# Patient Record
Sex: MALE | Race: WHITE | NOT HISPANIC OR LATINO | Employment: OTHER | URBAN - METROPOLITAN AREA
[De-identification: names, ages, dates, MRNs, and addresses within clinical notes are randomized per-mention and may not be internally consistent; named-entity substitution may affect disease eponyms.]

---

## 2018-01-09 ENCOUNTER — HOSPITAL ENCOUNTER (EMERGENCY)
Facility: HOSPITAL | Age: 63
Discharge: HOME | End: 2018-01-09
Attending: EMERGENCY MEDICINE | Admitting: EMERGENCY MEDICINE
Payer: COMMERCIAL

## 2018-01-09 VITALS
HEART RATE: 77 BPM | RESPIRATION RATE: 18 BRPM | TEMPERATURE: 97.7 F | SYSTOLIC BLOOD PRESSURE: 126 MMHG | DIASTOLIC BLOOD PRESSURE: 76 MMHG | OXYGEN SATURATION: 95 % | HEIGHT: 67 IN | WEIGHT: 230 LBS | BODY MASS INDEX: 36.1 KG/M2

## 2018-01-09 DIAGNOSIS — H16.9 KERATITIS: Primary | ICD-10-CM

## 2018-01-09 PROCEDURE — 99283 EMERGENCY DEPT VISIT LOW MDM: CPT

## 2018-01-09 RX ORDER — OFLOXACIN 3 MG/ML
1 SOLUTION/ DROPS OPHTHALMIC 4 TIMES DAILY
Qty: 5 ML | Refills: 0 | Status: SHIPPED | OUTPATIENT
Start: 2018-01-09 | End: 2018-01-16

## 2018-01-09 RX ADMIN — FLUORESCEIN SODIUM 1 STRIP: 0.6 STRIP OPHTHALMIC at 21:51

## 2018-01-10 NOTE — DISCHARGE INSTRUCTIONS
Take the antibiotic eyedrops as prescribed  Use a saline or salt water lubricating drops to help with any irritation  Use a warm compress to your eye lid to help with swelling and irritation  Follow up closely with your eye doctor for further evaluation of your eye  Keratitis   WHAT YOU NEED TO KNOW:   Keratitis is inflammation of the cornea  The cornea is the clear layer that covers the front of your eye  Keratitis usually affects one eye, but you can have it in both eyes  DISCHARGE INSTRUCTIONS:   Medicines:   · Infection medicine: You may be given medicine to treat an infection caused by bacteria, a fungus, or a virus  These medicines may be given as eyedrops or as pills  · Steroids: This medicine decreases inflammation  It is given as eyedrops  · Take your medicine as directed  Contact your healthcare provider if you think your medicine is not helping or if you have side effects  Tell him of her if you are allergic to any medicine  Keep a list of the medicines, vitamins, and herbs you take  Include the amounts, and when and why you take them  Bring the list or the pill bottles to follow-up visits  Carry your medicine list with you in case of an emergency  Follow up with or ophthalmologist as directed:  Write down your questions so you remember to ask them during your visits  Help prevent keratitis:   · Use contact lenses correctly  Know how long to use them and how to clean and store them properly  · Wash your hands often  Use soap and water  Wash your hands after you use the bathroom, change a child's diapers, or sneeze  Wash your hands before you prepare or eat food  · Wear safety equipment when you work, garden, or play sports  · Do not rub your eyes while you work with wood or metal   Contact your ophthalmologist if:   · You have a fever  · Your symptoms get worse, even after treatment  · You have pus draining from your eye       · You have questions or concerns about your condition or care  Return to the emergency department if:   · You have severe eye pain  · You have a sudden change in your vision  · You suddenly lose your vision  © 2017 [employer] Information is for End User's use only and may not be sold, redistributed or otherwise used for commercial purposes  All illustrations and images included in CareNotes® are the copyrighted property of [employer]  or Junior Wilkinson  The above information is an  only  It is not intended as medical advice for individual conditions or treatments  Talk to your doctor, nurse or pharmacist before following any medical regimen to see if it is safe and effective for you

## 2018-01-10 NOTE — ED PROVIDER NOTES
History  Chief Complaint   Patient presents with    Eye Problem     Patient reports eye swelling, patient reports twitching for the past 3 weeks and sweling started today     HPI    None       History reviewed  No pertinent past medical history  History reviewed  No pertinent surgical history  History reviewed  No pertinent family history  I have reviewed and agree with the history as documented  Social History   Substance Use Topics    Smoking status: Never Smoker    Smokeless tobacco: Never Used    Alcohol use Yes        Review of Systems    Physical Exam  ED Triage Vitals [01/09/18 2034]   Temperature Pulse Respirations Blood Pressure SpO2   97 7 °F (36 5 °C) 77 18 126/76 95 %      Temp Source Heart Rate Source Patient Position - Orthostatic VS BP Location FiO2 (%)   Oral Monitor Sitting Left arm --      Pain Score       2           Orthostatic Vital Signs  Vitals:    01/09/18 2034   BP: 126/76   Pulse: 77   Patient Position - Orthostatic VS: Sitting       Physical Exam   Constitutional: He is oriented to person, place, and time  He appears well-developed and well-nourished  No distress  HENT:   Head: Normocephalic and atraumatic  Eyes: Conjunctivae and EOM are normal  Pupils are equal, round, and reactive to light  Lids are everted and swept, no foreign bodies found  Left eye exhibits discharge (scant, clear mucous)  Left eye exhibits no chemosis, no exudate and no hordeolum  No foreign body present in the left eye  Slit lamp exam:       The left eye shows fluorescein uptake (mild, scattered, diffuse)  The left eye shows no corneal abrasion, no corneal flare, no corneal ulcer, no foreign body, no hyphema and no hypopyon  Neck: Normal range of motion  No tracheal deviation present  Cardiovascular: Normal rate, regular rhythm and intact distal pulses  Pulmonary/Chest: Effort normal  No respiratory distress  Neurological: He is alert and oriented to person, place, and time   GCS eye subscore is 4  GCS verbal subscore is 5  GCS motor subscore is 6  Skin: Skin is warm and dry  Psychiatric: He has a normal mood and affect  His behavior is normal    Nursing note and vitals reviewed  ED Medications  Medications   fluorescein sodium sterile ophthalmic strip 1 strip (1 strip Left Eye Given by Other 18)       Diagnostic Studies  Results Reviewed     None                 No orders to display              Procedures  Procedures       Phone Contacts  ED Phone Contact    ED Course  ED Course                                MDM  Number of Diagnoses or Management Options  Keratitis: new and does not require workup  Diagnosis management comments: This is a 26-year-old male who presents here today with left eye foreign body sensation  Notes that at night sometimes his left upper eyelid seems to twitch  He does endorse recent crying recently, as his wife  a little over a month ago  He has been rubbing at his eyes more frequently because of this, but denies any discharge or known injuries to his eyes  He denies any vision changes  He denies any photophobia or actual pain in the eye  He was rubbing his eye this evening and the lid felt slightly uncomfortable, and noticed it was slightly swollen in the mirror, prompting him to come to the ER for evaluation  He has not taken or done anything for his symptoms  He was supposed to follow up with his eye doctor this month for discussion of cataract surgery on his left eye, after he had surgery on his right eye about a year ago, but this appointment was canceled  He has not seen his eye doctor or his primary care doctor about his recent twitching, nor the swelling today  He denies any redness of the eye itself  He denies any known foreign body exposures  ROS: Otherwise negative, unless stated as above  He is well-appearing, in no acute distress   He has minimal swelling of the upper lid without erythema, more consistent with irritation then infectious  There is no tenderness to this area  The globe appears normal, however he does have mild diffuse fluorescein uptake across the entire cornea, more consistent with keratitis  There are no signs of foreign body, ulcers, abrasions  I discussed with the patient findings, treatment at home, follow-up, and indications for return, and he expresses understanding with this plan  CritCare Time    Disposition  Final diagnoses:   Keratitis - left     Time reflects when diagnosis was documented in both MDM as applicable and the Disposition within this note     Time User Action Codes Description Comment    1/9/2018 10:04 PM Radha Cosme Add [H16 9] Keratitis     1/9/2018 10:04 PM Radha Cosme Modify [H16 9] Keratitis left      ED Disposition     ED Disposition Condition Comment    Discharge  Lelia Vincent discharge to home/self care  Condition at discharge: Good        Follow-up Information     Follow up With Specialties Details Why Contact Info    your eye doctor  Schedule an appointment as soon as possible for a visit in 3 days to follow up on your eye         Patient's Medications   Discharge Prescriptions    OFLOXACIN (OCUFLOX) 0 3 % OPHTHALMIC SOLUTION    Administer 1 drop into the left eye 4 (four) times a day for 7 days       Start Date: 1/9/2018  End Date: 1/16/2018       Order Dose: 1 drop       Quantity: 5 mL    Refills: 0     No discharge procedures on file      ED Provider  Electronically Signed by           Hieu Roque MD  01/09/18 0837

## 2018-06-07 ENCOUNTER — HOSPITAL ENCOUNTER (EMERGENCY)
Facility: HOSPITAL | Age: 63
Discharge: HOME | End: 2018-06-07
Attending: EMERGENCY MEDICINE
Payer: COMMERCIAL

## 2018-06-07 VITALS
RESPIRATION RATE: 16 BRPM | BODY MASS INDEX: 36.02 KG/M2 | OXYGEN SATURATION: 94 % | WEIGHT: 230 LBS | DIASTOLIC BLOOD PRESSURE: 64 MMHG | TEMPERATURE: 97.6 F | SYSTOLIC BLOOD PRESSURE: 134 MMHG | HEART RATE: 66 BPM

## 2018-06-07 DIAGNOSIS — B35.9 TINEA: Primary | ICD-10-CM

## 2018-06-07 PROCEDURE — 99282 EMERGENCY DEPT VISIT SF MDM: CPT

## 2018-06-07 RX ORDER — MUPIROCIN CALCIUM 20 MG/G
CREAM TOPICAL 3 TIMES DAILY
Qty: 15 G | Refills: 0 | Status: SHIPPED | OUTPATIENT
Start: 2018-06-07 | End: 2019-12-07 | Stop reason: ALTCHOICE

## 2018-06-07 RX ORDER — CLOTRIMAZOLE 1 %
CREAM (GRAM) TOPICAL 2 TIMES DAILY
Qty: 30 G | Refills: 0 | Status: SHIPPED | OUTPATIENT
Start: 2018-06-07 | End: 2019-12-07 | Stop reason: ALTCHOICE

## 2018-06-07 NOTE — DISCHARGE INSTRUCTIONS
Skin Yeast Infection   WHAT YOU NEED TO KNOW:   Yeast is normally present on the skin  Infection happens when you have too much yeast, or when it gets into a cut on your skin  Certain types of mold and fungus can cause a yeast infection  A skin yeast infection can appear anywhere on your skin or nail beds  Skin yeast infections are usually found on warm, moist parts of the body  Examples include between skin folds or under the breasts  DISCHARGE INSTRUCTIONS:   Return to the emergency department if:   · You have signs of infection, such as pus, warmth or red streaks coming from the wound, or a fever  Contact your healthcare provider if:   · Your symptoms worsen or do not get better within 7 to 10 days  · You have new or returning signs of a skin yeast infection after treatment  · You have questions or concerns about your condition or care  Medicines:   · Antifungal medicine  may be given as a cream, ointment, or pill  · Take your medicine as directed  Contact your healthcare provider if you think your medicine is not helping or if you have side effects  Tell him or her if you are allergic to any medicine  Keep a list of the medicines, vitamins, and herbs you take  Include the amounts, and when and why you take them  Bring the list or the pill bottles to follow-up visits  Carry your medicine list with you in case of an emergency  Care for the skin near the infection:  You may only have discolored patches of skin, or areas that are dry and flaking  Care for these skin problems as directed by your healthcare provider  If you have painful skin or an open sore, you will need to protect the skin and prevent damage  You will also need to keep the skin dry as much as possible  Ask your healthcare provider how to care for your skin while the infection clears  The following are general guidelines for caring for painful or open skin:  · Keep the skin clean    Ask your healthcare provider if you should wash with mild soap and water  Do not use soap that contains alcohol  Alcohol can dry and irritate the skin and make symptoms worse  Your baby's healthcare provider may tell you to use diaper cream or ointment when you change his diaper  This will protect the skin and prevent moisture from collecting  · Keep the skin dry  Pat the area dry with a towel  Do not rub, because this may irritate the skin  If you have a skin yeast infection between skin folds, lift the top part gently and hold it while you dry between your skin folds  Always dry your feet completely after you swim or bathe, including between your toes  Dry your skin if you are sweating from exercise or exposure to heat  Use a clean towel each time to prevent spreading or continuing the infection  · Keep the skin protected  Ask your healthcare provider if you should cover the area with a bandage or leave it open  Check your skin each day to make sure you do not have new or worsening problems  You may need to have someone check the skin if you cannot see the area easily  Prevent another skin yeast infection:   · Do not share clothing or towels    · Wear shower shoes if you need to use a public shower    · Dry your feet completely after you bathe, and apply antifungal powder or cream as directed    · Put on socks before you get dressed so you do not spread fungus from your feet    · Wear light clothing that allows air to get to your skin    · Manage your weight to prevent skin folds where yeast can collect    · Manage diabetes    · Change your baby's diaper often, and keep the area clean and dry as much as possible    · Use a diaper cream or ointment that contains zinc oxide or dimethicone on your baby's diaper area as directed  Follow up with your healthcare provider as directed:  Write down your questions so you remember to ask them during your visits     © 2017 [employer] Information is for End User's use only and may not be sold, redistributed or otherwise used for commercial purposes  All illustrations and images included in CareNotes® are the copyrighted property of [employer]  or [employer] Wilkinson  The above information is an  only  It is not intended as medical advice for individual conditions or treatments  Talk to your doctor, nurse or pharmacist before following any medical regimen to see if it is safe and effective for you

## 2018-06-07 NOTE — ED PROVIDER NOTES
History  Chief Complaint   Patient presents with    Rash     pt has had a rash under his right armpit for the past two weeks that started when he was vactioning down [address] [place], also states he has been outside alot , reports feeling more tired denies seeing any bites     23-year-old male patient presents emergency department for evaluation of rash in his right armpit  The patient states that it has been there for several weeks, is very itchy, and is most clinically a appearing like tinea  Patient may have some small surrounding cellulitis will be treated with topical antibiotics  History provided by:  Patient   used: No    Rash   Location:  Shoulder/arm  Quality: itchiness and redness    Quality: not blistering and not bruising    Severity:  Mild  Timing:  Constant  Progression:  Worsening  Relieved by:  Nothing  Worsened by:  Nothing  Ineffective treatments:  None tried  Associated symptoms: no fever, no joint pain and no tongue swelling        None       History reviewed  No pertinent past medical history  History reviewed  No pertinent surgical history  History reviewed  No pertinent family history  I have reviewed and agree with the history as documented  Social History   Substance Use Topics    Smoking status: Never Smoker    Smokeless tobacco: Never Used    Alcohol use Yes        Review of Systems   Constitutional: Negative for fever  Musculoskeletal: Negative for arthralgias  Skin: Positive for rash  All other systems reviewed and are negative  Physical Exam  Physical Exam   Constitutional: He is oriented to person, place, and time  He appears well-developed and well-nourished  No distress  HENT:   Head: Normocephalic and atraumatic  Right Ear: External ear normal    Left Ear: External ear normal    Eyes: Conjunctivae and EOM are normal  Right eye exhibits no discharge  Left eye exhibits no discharge  No scleral icterus  Neck: Normal range of motion  Neck supple  No JVD present  No tracheal deviation present  No thyromegaly present  Cardiovascular: Normal rate and regular rhythm  Pulmonary/Chest: Effort normal and breath sounds normal  No stridor  No respiratory distress  He has no wheezes  He has no rales  Abdominal: Soft  Bowel sounds are normal  He exhibits no distension  There is no tenderness  Musculoskeletal: Normal range of motion  He exhibits no edema, tenderness or deformity  Neurological: He is alert and oriented to person, place, and time  No cranial nerve deficit  Coordination normal    Skin: Skin is warm and dry  He is not diaphoretic  Psychiatric: He has a normal mood and affect  His behavior is normal    Nursing note and vitals reviewed        Vital Signs  ED Triage Vitals [06/07/18 0154]   Temperature Pulse Respirations Blood Pressure SpO2   97 6 °F (36 4 °C) 63 16 134/64 93 %      Temp Source Heart Rate Source Patient Position - Orthostatic VS BP Location FiO2 (%)   Oral Monitor Sitting Right arm --      Pain Score       --           Vitals:    06/07/18 0154 06/07/18 0200   BP: 134/64 134/64   Pulse: 63 66   Patient Position - Orthostatic VS: Sitting        Visual Acuity      ED Medications  Medications - No data to display    Diagnostic Studies  Results Reviewed     None                 No orders to display              Procedures  Procedures       Phone Contacts  ED Phone Contact    ED Course                               MDM  Number of Diagnoses or Management Options  Tinea: new and requires workup     Amount and/or Complexity of Data Reviewed  Decide to obtain previous medical records or to obtain history from someone other than the patient: yes  Review and summarize past medical records: yes    Patient Progress  Patient progress: stable    CritCare Time    Disposition  Final diagnoses:   Tinea     Time reflects when diagnosis was documented in both MDM as applicable and the Disposition within this note     Time User Action Codes Description Comment    6/7/2018  2:22 AM Susie Laughter Add [B35 9] Tinea       ED Disposition     ED Disposition Condition Comment    Discharge  Khoa Parish discharge to home/self care  Condition at discharge: Good        Follow-up Information     Follow up With Specialties Details Why [address] , [address] [place] [address] Juan   [phone]            Discharge Medication List as of 6/7/2018  2:23 AM      START taking these medications    Details   clotrimazole (LOTRIMIN) 1 % cream Apply topically 2 (two) times a day, Starting u 6/7/2018, Print      mupirocin (BACTROBAN) 2 % cream Apply topically 3 (three) times a day, Starting Thu 6/7/2018, Print      pyrithione zinc (HEAD AND SHOULDERS) 1 % shampoo Apply topically daily as needed for dandruff, Starting Thu 6/7/2018, Print           No discharge procedures on file      ED Provider  Electronically Signed by           Eva Chacko DO  06/07/18 6718

## 2018-11-28 ENCOUNTER — OFFICE VISIT (OUTPATIENT)
Dept: URGENT CARE | Facility: CLINIC | Age: 63
End: 2018-11-28
Payer: COMMERCIAL

## 2018-11-28 VITALS
WEIGHT: 240 LBS | HEIGHT: 67 IN | DIASTOLIC BLOOD PRESSURE: 72 MMHG | HEART RATE: 103 BPM | BODY MASS INDEX: 37.67 KG/M2 | OXYGEN SATURATION: 95 % | TEMPERATURE: 98.1 F | SYSTOLIC BLOOD PRESSURE: 130 MMHG

## 2018-11-28 DIAGNOSIS — J06.9 UPPER RESPIRATORY TRACT INFECTION, UNSPECIFIED TYPE: Primary | ICD-10-CM

## 2018-11-28 PROCEDURE — S9088 SERVICES PROVIDED IN URGENT: HCPCS | Performed by: PHYSICIAN ASSISTANT

## 2018-11-28 PROCEDURE — 99203 OFFICE O/P NEW LOW 30 MIN: CPT | Performed by: PHYSICIAN ASSISTANT

## 2018-11-28 RX ORDER — DEXTROMETHORPHAN HYDROBROMIDE AND PROMETHAZINE HYDROCHLORIDE 15; 6.25 MG/5ML; MG/5ML
5 SYRUP ORAL 4 TIMES DAILY PRN
Qty: 118 ML | Refills: 0 | Status: SHIPPED | OUTPATIENT
Start: 2018-11-28 | End: 2019-12-07 | Stop reason: ALTCHOICE

## 2018-11-28 RX ORDER — ALBUTEROL SULFATE 90 UG/1
2 AEROSOL, METERED RESPIRATORY (INHALATION) EVERY 6 HOURS PRN
Qty: 1 INHALER | Refills: 0 | Status: SHIPPED | OUTPATIENT
Start: 2018-11-28 | End: 2018-12-05

## 2018-11-28 RX ORDER — PREDNISONE 20 MG/1
20 TABLET ORAL 2 TIMES DAILY WITH MEALS
Qty: 10 TABLET | Refills: 0 | Status: SHIPPED | OUTPATIENT
Start: 2018-11-28 | End: 2018-12-03

## 2018-11-28 NOTE — PROGRESS NOTES
[facility]        NAME: Barbara Judge is a 58 y o  male  : 1955    MRN: [id number]  DATE: 2018  TIME: 10:45 AM    Assessment and Plan   Upper respiratory tract infection, unspecified type [J06 9]  1  Upper respiratory tract infection, unspecified type  albuterol (PROVENTIL HFA,VENTOLIN HFA) 90 mcg/act inhaler    predniSONE 20 mg tablet    promethazine-dextromethorphan (PHENERGAN-DM) 6 25-15 mg/5 mL oral syrup         Patient Instructions     1  Upper respiratory infection  -Patient declined throat swab at this time  -Take prednisone as directed  -Use cough medicine as directed  -Use inhaler or nebulizer every 4-6 hours as needed  -Tylenol/Motrin  -Increase fluids  -Follow-up with PCP within 3-5 days if no improvement    Go to ER with worsening symptoms, fever, chest pain, shortness of breath, or any signs of distress    Chief Complaint     Chief Complaint   Patient presents with    Sore Throat     Started Monday  Accompanied by productive cough with yellow mucus  No sinus pressure  Mild fever on Monday  Not taking medication for symptoms  History of COPD         History of Present Illness       The patient is a 19-year-old male with a medical history of COPD who presents today for evaluation of cold symptoms that started on Monday  Patient admits that he had a sore throat on Monday which seems to have now improved  Patient continues with a cough that at times brings up yellow mucus  Patient states he has not tried any over-the-counter medications  Patient states he currently does not use an inhaler for his COPD because it is too expensive  Review of Systems   Review of Systems   Constitutional: Negative for chills and fever  HENT: Positive for sore throat  Negative for ear pain and rhinorrhea  Respiratory: Positive for cough and shortness of breath  Cardiovascular: Negative for chest pain  Musculoskeletal: Negative for arthralgias     Neurological: Negative for headaches  Current Medications       Current Outpatient Prescriptions:     albuterol (PROVENTIL HFA,VENTOLIN HFA) 90 mcg/act inhaler, Inhale 2 puffs every 6 (six) hours as needed for shortness of breath for up to 7 days, Disp: 1 Inhaler, Rfl: 0    clotrimazole (LOTRIMIN) 1 % cream, Apply topically 2 (two) times a day (Patient not taking: Reported on 11/28/2018 ), Disp: 30 g, Rfl: 0    mupirocin (BACTROBAN) 2 % cream, Apply topically 3 (three) times a day (Patient not taking: Reported on 11/28/2018 ), Disp: 15 g, Rfl: 0    predniSONE 20 mg tablet, Take 1 tablet (20 mg total) by mouth 2 (two) times a day with meals for 5 days, Disp: 10 tablet, Rfl: 0    promethazine-dextromethorphan (PHENERGAN-DM) 6 25-15 mg/5 mL oral syrup, Take 5 mL by mouth 4 (four) times a day as needed for cough, Disp: 118 mL, Rfl: 0    pyrithione zinc (HEAD AND SHOULDERS) 1 % shampoo, Apply topically daily as needed for dandruff (Patient not taking: Reported on 11/28/2018 ), Disp: 400 mL, Rfl: 0    Current Allergies     Allergies as of 11/28/2018    (No Known Allergies)            The following portions of the patient's history were reviewed and updated as appropriate: allergies, current medications, past family history, past medical history, past social history, past surgical history and problem list      History reviewed  No pertinent past medical history  History reviewed  No pertinent surgical history  History reviewed  No pertinent family history  Medications have been verified  Objective   /72 (BP Location: Left arm, Patient Position: Sitting, Cuff Size: Large)   Pulse 103   Temp 98 1 °F (36 7 °C) (Temporal)   Ht 5' 7" (1 702 m)   Wt 109 kg (240 lb)   SpO2 95%   BMI 37 59 kg/m²        Physical Exam     Physical Exam   Constitutional: He is oriented to person, place, and time  He appears well-developed and well-nourished  No distress     HENT:   Right Ear: Tympanic membrane and external ear normal    Left Ear: Tympanic membrane and external ear normal    Nose: Nose normal    Mouth/Throat: Uvula is midline, oropharynx is clear and moist and mucous membranes are normal  No oropharyngeal exudate, posterior oropharyngeal edema, posterior oropharyngeal erythema or tonsillar abscesses  Eyes: Pupils are equal, round, and reactive to light  Conjunctivae and EOM are normal    Neck: Normal range of motion  Neck supple  Cardiovascular: Normal rate, regular rhythm and normal heart sounds  Pulmonary/Chest: Effort normal  He has rhonchi in the right upper field and the left upper field  Lymphadenopathy:     He has no cervical adenopathy  Neurological: He is alert and oriented to person, place, and time  Skin: Skin is warm and dry  Psychiatric: He has a normal mood and affect  Nursing note and vitals reviewed

## 2018-11-28 NOTE — PATIENT INSTRUCTIONS
1  Upper respiratory infection  -Take prednisone as directed  -Use cough medicine as directed  -Use inhaler or nebulizer every 4-6 hours as needed  -Tylenol/Motrin  -Increase fluids  -Follow-up with PCP within 3-5 days if no improvement    Go to ER with worsening symptoms, fever, chest pain, shortness of breath, or any signs of distress    Upper Respiratory Infection   AMBULATORY CARE:   An upper respiratory infection  is also called a common cold  It can affect your nose, throat, ears, and sinuses  Common signs and symptoms include the following:  Cold symptoms are usually worst for the first 3 to 5 days  You may have any of the following:  · Runny or stuffy nose    · Sneezing and coughing    · Sore throat or hoarseness    · Red, watery, and sore eyes    · Fatigue     · Chills and fever    · Headache, body aches, or sore muscles  Seek care immediately if:   · You have chest pain or trouble breathing  Contact your healthcare provider if:   · You have a fever over 102ºF (39°C)  · Your sore throat gets worse or you see white or yellow spots in your throat  · Your symptoms get worse after 3 to 5 days or your cold is not better in 14 days  · You have a rash anywhere on your skin  · You have large, tender lumps in your neck  · You have thick, green or yellow drainage from your nose  · You cough up thick yellow, green, or bloody mucus  · You have vomiting for more than 24 hours and cannot keep fluids down  · You have a bad earache  · You have questions or concerns about your condition or care  Treatment for a cold: There is no cure for the common cold  Colds are caused by viruses and do not get better with antibiotics  Most people get better in 7 to 14 days  You may continue to cough for 2 to 3 weeks  The following may help decrease your symptoms:  · Decongestants  help reduce nasal congestion and help you breathe more easily   If you take decongestant pills, they may make you feel restless or not able to sleep  Do not use decongestant sprays for more than a few days  · Cough suppressants  help reduce coughing  Ask your healthcare provider which type of cough medicine is best for you  · NSAIDs , such as ibuprofen, help decrease swelling, pain, and fever  NSAIDs can cause stomach bleeding or kidney problems in certain people  If you take blood thinner medicine, always ask your healthcare provider if NSAIDs are safe for you  Always read the medicine label and follow directions  · Acetaminophen  decreases pain and fever  It is available without a doctor's order  Ask how much to take and how often to take it  Follow directions  Read the labels of all other medicines you are using to see if they also contain acetaminophen, or ask your doctor or pharmacist  Acetaminophen can cause liver damage if not taken correctly  Do not use more than 4 grams (4,000 milligrams) total of acetaminophen in one day  Manage your cold:   · Rest as much as possible  Slowly start to do more each day  · Drink more liquids as directed  Liquids will help thin and loosen mucus so you can cough it up  Liquids will also help prevent dehydration  Liquids that help prevent dehydration include water, fruit juice, and broth  Do not drink liquids that contain caffeine  Caffeine can increase your risk for dehydration  Ask your healthcare provider how much liquid to drink each day  · Soothe a sore throat  Gargle with warm salt water  This helps your sore throat feel better  Make salt water by dissolving ¼ teaspoon salt in 1 cup warm water  You may also suck on hard candy or throat lozenges  You may use a sore throat spray  · Use a humidifier or vaporizer  Use a cool mist humidifier or a vaporizer to increase air moisture in your home  This may make it easier for you to breathe and help decrease your cough  · Use saline nasal drops as directed  These help relieve congestion       · Apply petroleum-based jelly around the outside of your nostrils  This can decrease irritation from blowing your nose  · Do not smoke  Nicotine and other chemicals in cigarettes and cigars can make your symptoms worse  They can also cause infections such as bronchitis or pneumonia  Ask your healthcare provider for information if you currently smoke and need help to quit  E-cigarettes or smokeless tobacco still contain nicotine  Talk to your healthcare provider before you use these products  Prevent spreading your cold to others:   · Try to stay away from other people during the first 2 to 3 days of your cold when it is more easily spread  · Do not share food or drinks  · Do not share hand towels with household members  · Wash your hands often, especially after you blow your nose  Turn away from other people and cover your mouth and nose with a tissue when you sneeze or cough  Follow up with your healthcare provider as directed:  Write down your questions so you remember to ask them during your visits  © 2017 [employer]  Information is for End User's use only and may not be sold, redistributed or otherwise used for commercial purposes  All illustrations and images included in CareNotes® are the copyrighted property of [employer]  or [employer] Wilkinson  The above information is an  only  It is not intended as medical advice for individual conditions or treatments  Talk to your doctor, nurse or pharmacist before following any medical regimen to see if it is safe and effective for you

## 2019-08-06 ENCOUNTER — TELEPHONE (OUTPATIENT)
Dept: FAMILY MEDICINE CLINIC | Facility: CLINIC | Age: 64
End: 2019-08-06

## 2019-08-09 NOTE — TELEPHONE ENCOUNTER
Spoke to patient and let him know he came to the wrong office to sign release  He said he was signing a release from Dr Andrea Iqbal  He is going to their office to sign

## 2019-08-26 ENCOUNTER — OFFICE VISIT (OUTPATIENT)
Dept: UROLOGY | Facility: MEDICAL CENTER | Age: 64
End: 2019-08-26
Payer: COMMERCIAL

## 2019-08-26 VITALS
HEIGHT: 68 IN | SYSTOLIC BLOOD PRESSURE: 128 MMHG | DIASTOLIC BLOOD PRESSURE: 72 MMHG | HEART RATE: 89 BPM | WEIGHT: 220 LBS | BODY MASS INDEX: 33.34 KG/M2

## 2019-08-26 DIAGNOSIS — N13.8 BPH WITH URINARY OBSTRUCTION: Primary | ICD-10-CM

## 2019-08-26 DIAGNOSIS — N40.1 BPH WITH URINARY OBSTRUCTION: Primary | ICD-10-CM

## 2019-08-26 PROCEDURE — 99244 OFF/OP CNSLTJ NEW/EST MOD 40: CPT | Performed by: UROLOGY

## 2019-08-26 NOTE — PROGRESS NOTES
Assessment/Plan:  1  PSA quite low at 0 2  Prostate exam is irregular, I would not really called a nodule  2  No real symptoms at all  3  The very low PSA is reassuring, his exam is within normal limits  At this point no further need for evaluation with us unless something new comes up in terms of PSA, symptoms, or prostate exam   No problem-specific Assessment & Plan notes found for this encounter  Diagnoses and all orders for this visit:    BPH with urinary obstruction          Subjective:      Patient ID: Raúl Hanna is a 58 y o  male  Referred for prostate examination, patient has minimal symptoms  Nocturia x1  No hematuria infections stones  Denies prior treatment for any prostate or bladder conditions      The following portions of the patient's history were reviewed and updated as appropriate: allergies, current medications, past family history, past medical history, past social history, past surgical history and problem list     Review of Systems   All other systems reviewed and are negative  Objective:      /72 (BP Location: Left arm, Patient Position: Sitting, Cuff Size: Adult)   Pulse 89   Ht 5' 8" (1 727 m)   Wt 99 8 kg (220 lb)   BMI 33 45 kg/m²          Physical Exam   Constitutional: He is oriented to person, place, and time  He appears well-developed and well-nourished  No distress  HENT:   Head: Normocephalic and atraumatic  Eyes: Conjunctivae are normal    Cardiovascular: Normal rate and regular rhythm  Pulmonary/Chest: Effort normal and breath sounds normal  No respiratory distress  He has no wheezes  Abdominal: Soft  Bowel sounds are normal  He exhibits no distension and no mass  There is no tenderness  Genitourinary:   Genitourinary Comments: Penis testes normal    Prostate slightly irregular but not truly nodular   Neurological: He is alert and oriented to person, place, and time  Skin: Skin is warm and dry  He is not diaphoretic

## 2019-08-29 ENCOUNTER — OFFICE VISIT (OUTPATIENT)
Dept: GASTROENTEROLOGY | Facility: CLINIC | Age: 64
End: 2019-08-29
Payer: COMMERCIAL

## 2019-08-29 VITALS
BODY MASS INDEX: 37.47 KG/M2 | HEIGHT: 68 IN | WEIGHT: 247.2 LBS | HEART RATE: 78 BPM | DIASTOLIC BLOOD PRESSURE: 70 MMHG | SYSTOLIC BLOOD PRESSURE: 110 MMHG

## 2019-08-29 DIAGNOSIS — Z12.11 SCREENING FOR COLON CANCER: Primary | ICD-10-CM

## 2019-08-29 PROCEDURE — 99243 OFF/OP CNSLTJ NEW/EST LOW 30: CPT | Performed by: INTERNAL MEDICINE

## 2019-08-29 NOTE — PROGRESS NOTES
[facility] - Outpatient Consultation  Ranjit Ricci 58 y o  male MRN: [id number]  Encounter: [id number]          ASSESSMENT AND PLAN:      1  Screening for colon cancer    Schedule colonoscopy with a bowel prep  ______________________________________________________________________    HPI:  This 58year old male presents for the purpose of having his first screening colonoscopy  He denies abdominal pain  He has occasional diarrhea and constipation  He denies any rectal bleeding, melena, hematemesis, bloating, nausea, vomiting, heartburn, gas, change in bowel habits  He denies any family history of colon cancer  He denies weight loss  REVIEW OF SYSTEMS:    CONSTITUTIONAL: Denies any fever, chills, rigors, and weight loss  HEENT: No earache or tinnitus  Denies hearing loss or visual disturbances  CARDIOVASCULAR: No chest pain or palpitations  RESPIRATORY: Denies any cough, hemoptysis, shortness of breath or dyspnea on exertion  GASTROINTESTINAL: As noted in the History of Present Illness  GENITOURINARY: No problems with urination  Denies any hematuria or dysuria  NEUROLOGIC: No dizziness or vertigo, denies headaches  MUSCULOSKELETAL: Denies any muscle or joint pain  SKIN: Denies skin rashes or itching  ENDOCRINE: Denies excessive thirst  Denies intolerance to heat or cold  PSYCHOSOCIAL: Denies depression or anxiety  Denies any recent memory loss         Historical Information   Past Medical History:   Diagnosis Date    No pertinent past medical history      Past Surgical History:   Procedure Laterality Date    BREAST LUMPECTOMY      0     Social History   Social History     Substance and Sexual Activity   Alcohol Use Yes    Comment: occ     Social History     Substance and Sexual Activity   Drug Use Yes    Types: Marijuana    Comment: occ     Social History     Tobacco Use   Smoking Status Never Smoker   Smokeless Tobacco Never Used     Family History Problem Relation Age of Onset    Hypertension Father     Stroke Father        Meds/Allergies       Current Outpatient Medications:     clotrimazole (LOTRIMIN) 1 % cream    mupirocin (BACTROBAN) 2 % cream    promethazine-dextromethorphan (PHENERGAN-DM) 6 25-15 mg/5 mL oral syrup    pyrithione zinc (HEAD AND SHOULDERS) 1 % shampoo    No Known Allergies        Objective     Blood pressure 110/70, pulse 78, height 5' 8" (1 727 m), weight 112 kg (247 lb 3 2 oz)  Body mass index is 37 59 kg/m²  PHYSICAL EXAM:      General Appearance:   Alert, cooperative, no distress   HEENT:   Normocephalic, atraumatic, anicteric      Neck:  Supple, symmetrical, trachea midline   Lungs:   Clear to auscultation bilaterally; no rales, rhonchi or wheezing; respirations unlabored    Heart[de-identified]   Regular rate and rhythm; no murmur, rub, or gallop  Abdomen:   Soft, non-tender, non-distended; normal bowel sounds; no masses, no organomegaly    Genitalia:   Deferred    Rectal:   Deferred    Extremities:  No cyanosis, clubbing or edema    Pulses:  2+ and symmetric    Skin:  No jaundice, rashes, or lesions    Lymph nodes:  No palpable cervical lymphadenopathy        Lab Results:   No visits with results within 1 Day(s) from this visit  Latest known visit with results is:   No results found for any previous visit  Radiology Results:   No results found

## 2019-09-03 ENCOUNTER — HOSPITAL ENCOUNTER (OUTPATIENT)
Dept: GASTROENTEROLOGY | Facility: HOSPITAL | Age: 64
Setting detail: OUTPATIENT SURGERY
Discharge: HOME | End: 2019-09-03
Attending: INTERNAL MEDICINE
Payer: COMMERCIAL

## 2019-09-03 ENCOUNTER — ANESTHESIA (OUTPATIENT)
Dept: GASTROENTEROLOGY | Facility: HOSPITAL | Age: 64
End: 2019-09-03

## 2019-09-03 ENCOUNTER — ANESTHESIA EVENT (OUTPATIENT)
Dept: GASTROENTEROLOGY | Facility: HOSPITAL | Age: 64
End: 2019-09-03

## 2019-09-03 VITALS
HEART RATE: 64 BPM | OXYGEN SATURATION: 94 % | RESPIRATION RATE: 18 BRPM | BODY MASS INDEX: 36.42 KG/M2 | SYSTOLIC BLOOD PRESSURE: 109 MMHG | WEIGHT: 240.3 LBS | HEIGHT: 68 IN | DIASTOLIC BLOOD PRESSURE: 63 MMHG | TEMPERATURE: 98 F

## 2019-09-03 DIAGNOSIS — Z12.11 SCREENING FOR COLON CANCER: ICD-10-CM

## 2019-09-03 PROCEDURE — 88305 TISSUE EXAM BY PATHOLOGIST: CPT | Performed by: PATHOLOGY

## 2019-09-03 PROCEDURE — 45385 COLONOSCOPY W/LESION REMOVAL: CPT | Performed by: INTERNAL MEDICINE

## 2019-09-03 RX ORDER — SODIUM CHLORIDE, SODIUM LACTATE, POTASSIUM CHLORIDE, CALCIUM CHLORIDE 600; 310; 30; 20 MG/100ML; MG/100ML; MG/100ML; MG/100ML
125 INJECTION, SOLUTION INTRAVENOUS CONTINUOUS
Status: CANCELLED | OUTPATIENT
Start: 2019-09-03

## 2019-09-03 RX ORDER — SODIUM CHLORIDE, SODIUM LACTATE, POTASSIUM CHLORIDE, CALCIUM CHLORIDE 600; 310; 30; 20 MG/100ML; MG/100ML; MG/100ML; MG/100ML
INJECTION, SOLUTION INTRAVENOUS CONTINUOUS PRN
Status: DISCONTINUED | OUTPATIENT
Start: 2019-09-03 | End: 2019-09-03 | Stop reason: SURG

## 2019-09-03 RX ORDER — LIDOCAINE HYDROCHLORIDE 10 MG/ML
INJECTION, SOLUTION INFILTRATION; PERINEURAL AS NEEDED
Status: DISCONTINUED | OUTPATIENT
Start: 2019-09-03 | End: 2019-09-03 | Stop reason: SURG

## 2019-09-03 RX ORDER — PROPOFOL 10 MG/ML
INJECTION, EMULSION INTRAVENOUS AS NEEDED
Status: DISCONTINUED | OUTPATIENT
Start: 2019-09-03 | End: 2019-09-03 | Stop reason: SURG

## 2019-09-03 RX ADMIN — PROPOFOL 50 MG: 10 INJECTION, EMULSION INTRAVENOUS at 09:23

## 2019-09-03 RX ADMIN — LIDOCAINE HYDROCHLORIDE 50 MG: 10 INJECTION, SOLUTION INFILTRATION; PERINEURAL at 09:10

## 2019-09-03 RX ADMIN — PROPOFOL 150 MG: 10 INJECTION, EMULSION INTRAVENOUS at 09:10

## 2019-09-03 RX ADMIN — PROPOFOL 50 MG: 10 INJECTION, EMULSION INTRAVENOUS at 09:30

## 2019-09-03 RX ADMIN — PROPOFOL 50 MG: 10 INJECTION, EMULSION INTRAVENOUS at 09:26

## 2019-09-03 RX ADMIN — SODIUM CHLORIDE, SODIUM LACTATE, POTASSIUM CHLORIDE, AND CALCIUM CHLORIDE: .6; .31; .03; .02 INJECTION, SOLUTION INTRAVENOUS at 08:59

## 2019-09-03 RX ADMIN — PROPOFOL 50 MG: 10 INJECTION, EMULSION INTRAVENOUS at 09:18

## 2019-09-03 RX ADMIN — PROPOFOL 50 MG: 10 INJECTION, EMULSION INTRAVENOUS at 09:13

## 2019-09-03 NOTE — H&P
History and Physical - [facility]  Lyndon Carvalho 58 y o  male MRN: [id number]      HPI: Lyndon Carvalho is a 58y o  year old male who presents for screening colonoscopy      REVIEW OF SYSTEMS: Per the HPI, and otherwise unremarkable  Historical Information   Past Medical History:   Diagnosis Date    Anxiety     Depression     No pertinent past medical history      Past Surgical History:   Procedure Laterality Date    BREAST LUMPECTOMY      0    TONSILLECTOMY       Social History   Social History     Substance and Sexual Activity   Alcohol Use Yes    Alcohol/week: 2 0 standard drinks    Types: 2 Cans of beer per week    Frequency: 2-3 times a week    Drinks per session: 3 or 4    Comment: occ     Social History     Substance and Sexual Activity   Drug Use Yes    Types: Marijuana    Comment: occ     Social History     Tobacco Use   Smoking Status Never Smoker   Smokeless Tobacco Never Used     Family History   Problem Relation Age of Onset    Hypertension Father     Stroke Father        Meds/Allergies       (Not in a hospital admission)    No Known Allergies    Objective     Blood pressure 127/74, pulse 70, temperature 97 7 °F (36 5 °C), temperature source Temporal, resp  rate 16, height 5' 8" (1 727 m), weight 109 kg (240 lb 4 8 oz), SpO2 96 %  PHYSICAL EXAM    Gen: NAD  CV: RRR  CHEST: Clear  ABD: soft, NT/ND  EXT: no edema      ASSESSMENT/PLAN:  This is a 58y o  year old male here for screening colonoscopy, and he is stable and optimized for his procedure

## 2019-09-03 NOTE — ANESTHESIA PREPROCEDURE EVALUATION
Review of Systems/Medical History  Patient summary reviewed  Chart reviewed  No history of anesthetic complications     Cardiovascular  Exercise tolerance (METS): >4,     Pulmonary       GI/Hepatic            Endo/Other    Obesity    GYN       Hematology   Musculoskeletal       Neurology   Psychology   Anxiety, Depression ,              Physical Exam    Airway    Mallampati score: III  TM Distance: >3 FB  Neck ROM: full     Dental   No notable dental hx     Cardiovascular      Pulmonary      Other Findings        Anesthesia Plan  ASA Score- 2     Anesthesia Type- IV sedation with anesthesia with ASA Monitors  Additional Monitors:   Airway Plan:         Plan Factors-    Induction- intravenous  Postoperative Plan-     Informed Consent- Anesthetic plan and risks discussed with patient  I personally reviewed this patient with the CRNA  Discussed and agreed on the Anesthesia Plan with the CRNA  Minna Castillo

## 2019-09-03 NOTE — DISCHARGE INSTRUCTIONS
Colonoscopy   WHAT YOU NEED TO KNOW:   A colonoscopy is a procedure to examine the inside of your colon (intestine) with a scope  Polyps or tissue growths may have been removed during your colonoscopy  It is normal to feel bloated and to have some abdominal discomfort  You should be passing gas  If you have hemorrhoids or you had polyps removed, you may have a small amount of bleeding  DISCHARGE INSTRUCTIONS:   Seek care immediately if:   · You have a large amount of bright red blood in your bowel movements  · Your abdomen is hard and firm and you have severe pain  · You have sudden trouble breathing  Contact your healthcare provider if:   · You develop a rash or hives  · You have a fever within 24 hours of your procedure  · You have not had a bowel movement for 3 days after your procedure  · You have questions or concerns about your condition or care  Activity:   · Do not lift, strain, or run  for 3 days after your procedure  · Rest after your procedure  You have been given medicine to relax you  Do not  drive or make important decisions until the day after your procedure  Return to your normal activity as directed  · Relieve gas and discomfort from bloating  by lying on your right side with a heating pad on your abdomen  You may need to take short walks to help the gas move out  Eat small meals until bloating is relieved  If you had polyps removed: For 7 days after your procedure:  · Do not  take aspirin  · Do not  go on long car rides  Help prevent constipation:   · Eat a variety of healthy foods  Healthy foods include fruit, vegetables, whole-grain breads, low-fat dairy products, beans, lean meat, and fish  Ask if you need to be on a special diet  Your healthcare provider may recommend that you eat high-fiber foods such as cooked beans  Fiber helps you have regular bowel movements  · Drink liquids as directed    Adults should drink between 9 and 13 eight-ounce cups of liquid every day  Ask what amount is best for you  For most people, good liquids to drink are water, juice, and milk  · Exercise as directed  Talk to your healthcare provider about the best exercise plan for you  Exercise can help prevent constipation, decrease your blood pressure and improve your health  Follow up with your healthcare provider as directed:  Write down your questions so you remember to ask them during your visits  © 2017 [employer] Information is for End User's use only and may not be sold, redistributed or otherwise used for commercial purposes  All illustrations and images included in CareNotes® are the copyrighted property of [employer]  or Junior Wilkinson  The above information is an  only  It is not intended as medical advice for individual conditions or treatments  Talk to your doctor, nurse or pharmacist before following any medical regimen to see if it is safe and effective for you

## 2019-10-28 ENCOUNTER — APPOINTMENT (EMERGENCY)
Dept: RADIOLOGY | Facility: HOSPITAL | Age: 64
End: 2019-10-28

## 2019-10-28 ENCOUNTER — HOSPITAL ENCOUNTER (EMERGENCY)
Facility: HOSPITAL | Age: 64
Discharge: HOME | End: 2019-10-28
Attending: EMERGENCY MEDICINE
Payer: MEDICARE

## 2019-10-28 ENCOUNTER — TELEPHONE (OUTPATIENT)
Dept: PHYSICAL THERAPY | Facility: OTHER | Age: 64
End: 2019-10-28

## 2019-10-28 VITALS
HEIGHT: 68 IN | HEART RATE: 77 BPM | DIASTOLIC BLOOD PRESSURE: 61 MMHG | WEIGHT: 240 LBS | RESPIRATION RATE: 18 BRPM | BODY MASS INDEX: 36.37 KG/M2 | TEMPERATURE: 97.5 F | SYSTOLIC BLOOD PRESSURE: 130 MMHG | OXYGEN SATURATION: 95 %

## 2019-10-28 DIAGNOSIS — M54.9 BACK PAIN: Primary | ICD-10-CM

## 2019-10-28 DIAGNOSIS — M25.552 HIP PAIN, ACUTE, LEFT: ICD-10-CM

## 2019-10-28 PROCEDURE — 96372 THER/PROPH/DIAG INJ SC/IM: CPT

## 2019-10-28 PROCEDURE — 99284 EMERGENCY DEPT VISIT MOD MDM: CPT | Performed by: EMERGENCY MEDICINE

## 2019-10-28 PROCEDURE — 99283 EMERGENCY DEPT VISIT LOW MDM: CPT

## 2019-10-28 PROCEDURE — 72100 X-RAY EXAM L-S SPINE 2/3 VWS: CPT

## 2019-10-28 PROCEDURE — 73502 X-RAY EXAM HIP UNI 2-3 VIEWS: CPT

## 2019-10-28 RX ORDER — NAPROXEN 500 MG/1
500 TABLET ORAL 2 TIMES DAILY WITH MEALS
Qty: 30 TABLET | Refills: 0 | Status: SHIPPED | OUTPATIENT
Start: 2019-10-28 | End: 2019-12-07 | Stop reason: ALTCHOICE

## 2019-10-28 RX ORDER — KETOROLAC TROMETHAMINE 30 MG/ML
30 INJECTION, SOLUTION INTRAMUSCULAR; INTRAVENOUS ONCE
Status: COMPLETED | OUTPATIENT
Start: 2019-10-28 | End: 2019-10-28

## 2019-10-28 RX ORDER — LIDOCAINE 50 MG/G
1 PATCH TOPICAL DAILY
Qty: 30 PATCH | Refills: 0 | Status: SHIPPED | OUTPATIENT
Start: 2019-10-28 | End: 2019-12-07 | Stop reason: ALTCHOICE

## 2019-10-28 RX ADMIN — KETOROLAC TROMETHAMINE 30 MG: 30 INJECTION, SOLUTION INTRAMUSCULAR at 04:13

## 2019-10-28 NOTE — TELEPHONE ENCOUNTER
This nurse did attempt to contact Pt with the only phone # provided, but unable to leave a voice mail, due to " no voice mail is set up with this #"  This was our first attempt at reaching this Pt

## 2019-10-28 NOTE — ED PROVIDER NOTES
History  Chief Complaint   Patient presents with    Back Pain     Patient walked in complaining of back pain s/p injury 2 weeks ago while lifting something  HPI  69-year-old male presents left-sided low back pain that radiates into his hip  He states that 2 weeks ago he lifted a sandbag and injured his back  Then 2 days ago he was taking a rock out of the ground with a shovel and worsens his pain  Has not tried any medication  Moving around makes worse and lying still makes better  He is able to ambulate  No weakness, numbness, fevers, chills, trauma or IV drug use  Prior to Admission Medications   Prescriptions Last Dose Informant Patient Reported? Taking? clotrimazole (LOTRIMIN) 1 % cream  Self No No   Sig: Apply topically 2 (two) times a day   Patient not taking: Reported on 11/28/2018    mupirocin (BACTROBAN) 2 % cream  Self No No   Sig: Apply topically 3 (three) times a day   Patient not taking: Reported on 11/28/2018    promethazine-dextromethorphan (PHENERGAN-DM) 6 25-15 mg/5 mL oral syrup  Self No No   Sig: Take 5 mL by mouth 4 (four) times a day as needed for cough   Patient not taking: Reported on 8/26/2019   pyrithione zinc (HEAD AND SHOULDERS) 1 % shampoo  Self No No   Sig: Apply topically daily as needed for dandruff   Patient not taking: Reported on 11/28/2018       Facility-Administered Medications: None       Past Medical History:   Diagnosis Date    Anxiety     Depression     Disease of thyroid gland     No pertinent past medical history        Past Surgical History:   Procedure Laterality Date    BREAST LUMPECTOMY      1997    TONSILLECTOMY         Family History   Problem Relation Age of Onset    Hypertension Father     Stroke Father      I have reviewed and agree with the history as documented  Social History     Tobacco Use    Smoking status: Never Smoker    Smokeless tobacco: Never Used   Substance Use Topics    Alcohol use:  Yes     Alcohol/week: 2 0 standard drinks Types: 2 Cans of beer per week     Frequency: 2-3 times a week     Drinks per session: 3 or 4     Comment: occ    Drug use: Yes     Types: Marijuana     Comment: occ        Review of Systems   Constitutional: Negative for chills and fever  HENT: Negative for dental problem and ear pain  Eyes: Negative for pain and redness  Respiratory: Negative for cough and shortness of breath  Cardiovascular: Negative for chest pain and palpitations  Gastrointestinal: Negative for abdominal pain and nausea  Endocrine: Negative for polydipsia and polyphagia  Genitourinary: Negative for dysuria and frequency  Musculoskeletal: Positive for arthralgias and back pain  Negative for joint swelling  Skin: Negative for color change and rash  Neurological: Negative for dizziness and headaches  Psychiatric/Behavioral: Negative for behavioral problems and confusion  All other systems reviewed and are negative  Physical Exam  Physical Exam   Constitutional: He is oriented to person, place, and time  He appears well-developed and well-nourished  No distress  HENT:   Head: Atraumatic  Right Ear: External ear normal    Left Ear: External ear normal    Nose: Nose normal    Eyes: Pupils are equal, round, and reactive to light  Conjunctivae and EOM are normal    Neck: Normal range of motion  Neck supple  No JVD present  Cardiovascular: Normal rate, regular rhythm and normal heart sounds  No murmur heard  Pulmonary/Chest: Effort normal and breath sounds normal  No respiratory distress  He has no wheezes  Abdominal: Soft  Bowel sounds are normal  He exhibits no distension  There is no tenderness  Musculoskeletal: Normal range of motion  He exhibits no edema  Normal strength and sensation b/l lower extremities, no midline TTP spine, normal gait   Neurological: He is alert and oriented to person, place, and time  No cranial nerve deficit  Skin: Skin is warm and dry   Capillary refill takes less than 2 seconds  He is not diaphoretic  Psychiatric: He has a normal mood and affect  His behavior is normal    Nursing note and vitals reviewed  Vital Signs  ED Triage Vitals [10/28/19 0336]   Temperature Pulse Respirations Blood Pressure SpO2   97 5 °F (36 4 °C) 77 18 130/61 95 %      Temp Source Heart Rate Source Patient Position - Orthostatic VS BP Location FiO2 (%)   Oral Monitor Lying Right arm --      Pain Score       3           Vitals:    10/28/19 0336   BP: 130/61   Pulse: 77   Patient Position - Orthostatic VS: Lying         Visual Acuity      ED Medications  Medications   ketorolac (TORADOL) injection 30 mg (30 mg Intramuscular Given 10/28/19 0413)       Diagnostic Studies  Results Reviewed     None                 XR hip/pelv 2-3 vws left if performed    (Results Pending)   XR spine lumbar 2 or 3 views injury    (Results Pending)              Procedures  Procedures       ED Course                               MDM  70 yo M with low back pain radiating to left hip, normal exam no red flags no bowel/bladder incontinence, no weakness or numbness doubt cauda equina, will have him follow up with comprehensive spine  Disposition  Final diagnoses:   Back pain   Hip pain, acute, left     Time reflects when diagnosis was documented in both MDM as applicable and the Disposition within this note     Time User Action Codes Description Comment    10/28/2019  4:47 AM Brynn Iniguez [M54 9] Back pain     10/28/2019  4:47 AM Brynn Iniguez [M25 [id number]] Hip pain, acute, left       ED Disposition     ED Disposition Condition Date/Time Comment    Discharge Stable Mon Oct 28, 2019  4:47 AM Sheela Blankenship discharge to home/self care              Follow-up Information     Follow up With Specialties Details Why Contact Info Additional Information    [facility] Physical Therapy Call   [phone]          Discharge Medication List as of 10/28/2019  4:49 AM      START taking these medications    Details   lidocaine (LIDODERM) 5 % Apply 1 patch topically daily Remove & Discard patch within 12 hours or as directed by MD, Starting Mon 10/28/2019, Print      naproxen (NAPROSYN) 500 mg tablet Take 1 tablet (500 mg total) by mouth 2 (two) times a day with meals, Starting Mon 10/28/2019, Print         CONTINUE these medications which have NOT CHANGED    Details   clotrimazole (LOTRIMIN) 1 % cream Apply topically 2 (two) times a day, Starting Thu 6/7/2018, Print      mupirocin (BACTROBAN) 2 % cream Apply topically 3 (three) times a day, Starting Thu 6/7/2018, Print      promethazine-dextromethorphan (PHENERGAN-DM) 6 25-15 mg/5 mL oral syrup Take 5 mL by mouth 4 (four) times a day as needed for cough, Starting Wed 11/28/2018, Normal      pyrithione zinc (HEAD AND SHOULDERS) 1 % shampoo Apply topically daily as needed for dandruff, Starting Thu 6/7/2018, Print               ED Provider  Electronically Signed by           Annalisa Hirsch MD  10/28/19 1474

## 2019-10-28 NOTE — DISCHARGE INSTRUCTIONS
Take medication as prescribed for pain, also use lidocaine patch over the area where the pain is worse  you will get a call from the [address] to help you in the next step is for care  If pain is worse return to ED

## 2019-10-28 NOTE — TELEPHONE ENCOUNTER
After explanation of the program the patient is refusing at this time  Patient was provided with our ph# and hours of business if he would change his mind and wish to go through the program     Referral closed

## 2019-10-30 ENCOUNTER — NURSE TRIAGE (OUTPATIENT)
Dept: PHYSICAL THERAPY | Facility: OTHER | Age: 64
End: 2019-10-30

## 2019-10-30 DIAGNOSIS — M54.41 ACUTE LEFT-SIDED LOW BACK PAIN WITH BILATERAL SCIATICA: Primary | ICD-10-CM

## 2019-10-30 DIAGNOSIS — M54.42 ACUTE LEFT-SIDED LOW BACK PAIN WITH BILATERAL SCIATICA: Primary | ICD-10-CM

## 2019-10-30 NOTE — TELEPHONE ENCOUNTER
Background - Initial Assessment  Clinical complaint: acute left sided lower back with bilateral numbness and tingling of the legs  Patient states that 2 weeks ago he was lifting a sandbag and felt pain in his lower back he stated that this pain started to resolve after a few days  Patient stated that 3 days ago he was moving a rock in his yard and the pain returned and is going down both his legs with numbness and tingling  Patient states pain is better when he lying down but severe as soon as he gets up to walk  Date of onset: 2 weeks ago  Frequency of pain: constant  Quality of pain: aching and dull while resting and sharp pain when getting up  Protocols used: SL AMB COMPREHENSIVE SPINE PROGRAM PROTOCOL    This RN did review in detail the Comprehensive Spine Program and what we can provide for their back pain  Patient is agreeable to being triaged by this RN and would like to proceed with Physical Therapy  Referral was placed for Physical Therapy at the [facility]  Patients information was transferred to the  to make evaluation appointment  Patient made aware that the PT office will be contacting him to schedule his evaluation  Patient made aware per protocol, a referral would be entered to [facility] [place], [facility] by [facility] based on the triage intake assessment questions  The goal is to take care of patient's emotional well-being in addition to the physical well-being  Patient aware that someone from [facility] will reaching out to them with a phone call to discuss options and services if needed  Patient verbalized understanding of referral     Behavioral referral was sent and the patient is aware that they will be receiving a phone call from that office  No further questions and/or concerns were voiced by the patient at this time  Patient states understanding of the referral that was placed

## 2019-11-04 ENCOUNTER — EVALUATION (OUTPATIENT)
Dept: PHYSICAL THERAPY | Facility: CLINIC | Age: 64
End: 2019-11-04
Payer: MEDICARE

## 2019-11-04 VITALS — TEMPERATURE: 98.5 F | DIASTOLIC BLOOD PRESSURE: 74 MMHG | SYSTOLIC BLOOD PRESSURE: 132 MMHG | HEART RATE: 63 BPM

## 2019-11-04 DIAGNOSIS — M54.42 ACUTE LEFT-SIDED LOW BACK PAIN WITH BILATERAL SCIATICA: ICD-10-CM

## 2019-11-04 DIAGNOSIS — M54.41 ACUTE LEFT-SIDED LOW BACK PAIN WITH BILATERAL SCIATICA: ICD-10-CM

## 2019-11-04 PROCEDURE — 97162 PT EVAL MOD COMPLEX 30 MIN: CPT | Performed by: PHYSICAL THERAPIST

## 2019-11-04 NOTE — PROGRESS NOTES
PT Evaluation     Today's date: 2019  Patient name: Lydia Saucedo  : 1955  MRN: [id number]  Referring provider: Wilfrido Petersen MD  Dx:   Encounter Diagnosis     ICD-10-CM    1  Acute left-sided low back pain with bilateral sciatica M54 42 Ambulatory referral to PT spine    M54 41                   Assessment  Assessment details: Lydia Saucedo is a pleasant 71 y o  presenting to physical therapy with MD referral for Acute left-sided low back pain with bilateral sciatica  Patient's sensation and myotomes are intact with 1+ reflexes throughout BLEs  No increase in neural tension noted with testing  Problem list:  Limited lumbar AROM, decreased hip/core strength, limited lower extremity flexibility, poor balance, and slow gait  Treatment to include: Manual therapy techniques, lower extremity/core strengthening, neuromuscular control exercises, balance/proprioception training, flexion biased program, instruction in a comprehensive HEP, and modalities as needed  This pt would benefit from skilled PT services to address their impairments and functional limitations to maximize functional outcome  Symptom irritability: moderateBarriers to therapy: High BMI  Understanding of Dx/Px/POC: good   Prognosis: good  Prognosis details: obesity    Goals  ST  Pt will improve lumbar sidebend ROM to at least 15 degrees in 4 weeks  2  Pt will improve hip IR to no more than moderate restriction in 4 weeks  LT  Pt will be able to negotiate stairs with a reciprocal pattern with no more than mild pain in 8 weeks  2  Pt will be independent in a comprehensive HEP in 8 weeks  3  Pt will be able to walk > 20 minutes to facilitate grocery shopping with no more than mild discomfort in 8 weeks  Plan  Plan details: 2 x per week for 6-8 weeks      START BACK TOOL: High Risk (12-18 visits)  LUMBAR TREATMENT CLASSIFICATION: Flexion Biased program  Patient would benefit from: skilled physical therapy  Frequency: 2x week  Duration in weeks: 8  Treatment plan discussed with: patient        Subjective Evaluation    History of Present Illness  Mechanism of injury: Pt reports 2 weeks ago, he was lifting and landscaping  Pt states he lifted a 100# bag of cement and afterwards felt severe pain across his lower back  Pt reports he rested that day which did not help his symptoms  Pt reports two days later, he went to the ER where x-rays were performed of lower back and hip which revealed mild OA in hip and DDD  Pt was given an injection in the hospital which helped with his pain for about 8 hours  Pt was referred to comprehensive spine program  Pt reports since the injury, he was experiencing bilateral radicular symptoms every time he would try to stand (posterior thigh to posterior gastroc)  Pt reports he now experiences pain mainly down right leg to posterior gastrocnemius muscle  Pt reports he has noticed his urine stream has become less and he has become constipated since his lower back pain  Pt denies any saddle anesthesia, but reports a dull pain in his genitals over the past 1 5 weeks  Pt states he feels the change in his urine stream is due to dehydration  Pt reports pain is around his kidneys on the right side and the pain increases with a full bladder, but does not reduce with urination  Premorbid status:  - ADLs: Independent with no difficulty  - Work: Not a working individual- retired  - Recreation:walking for exercise occasionally  Current status:   - ADLs/Functional activities:    - Stairs Step to pattern with Pain Levels: moderate pain   - Sit to stand with moderate pain   - Walking 5 minutes prior to increase in radicular symptoms in right leg   - Standing 5 minutes prior to increase in pain in right lower back/right leg   - Sitting 30 minutes prior to increase in lumbar and leg pain     - Sleeping with 1 nightly sleep disturbances due to pain   - Bending forward to don/doff socks and shoes with moderate pain   - Lifting a few pieces of firewood with no increase in pain  - Work: Not a working individual- retired  - Recreation: none  Pain  Current pain ratin  At best pain ratin  At worst pain ratin  Location: Right posterior gastroc and right lower back  Quality: knife-like  Progression: improved    Treatments  Previous treatment: medication and injection treatment  Current treatment: physical therapy  Patient Goals  Patient goals for therapy: decreased pain, increased motion, increased strength, return to sport/leisure activities and independence with ADLs/IADLs          Objective     Postural Observations    Additional Postural Observation Details  Standing with lumbar spine flexed 20 degrees  Palpation   Left   Muscle spasm in the erector spinae and quadratus lumborum  Tenderness of the erector spinae and quadratus lumborum  Right   Muscle spasm in the erector spinae and quadratus lumborum  Tenderness of the erector spinae and quadratus lumborum  Additional Palpation Details  Increased tension in B piriformis muscles (R more painful than L)    Tenderness     Lumbar Spine  Tenderness in the facet joint (L3-L5 on R)  No tenderness in the left transverse process  Left Hip   No tenderness in the ASIS and PSIS  Right Hip   No tenderness in the ASIS and PSIS  Neurological Testing     Sensation     Lumbar   Left   Intact: light touch    Right   Intact: light touch    Reflexes   Left   Patellar (L4): trace (1+)  Achilles (S1): trace (1+)  Clonus sign: negative    Right   Patellar (L4): trace (1+)  Achilles (S1): trace (1+)  Clonus sign: negative    Active Range of Motion     Lumbar   Flexion: 70 degrees   Extension: 18 (Feeling of weakness in bilateral legs) degrees   Left lateral flexion: 7 degrees       Right lateral flexion: 10 degrees     Additional Active Range of Motion Details  No increase in tingling in bilateral legs with any lumbar motion      Joint Play Hypomobile: L1, L2, L3, L4 and L5     Pain: L3 and L5   L3 comments: Increase in B radicular symptoms  L4 comments: Increase in B radicular symptoms  L5 comments: Increase in B radicular symptoms  Mechanical Assessment    Cervical      Thoracic      Lumbar    Standing flexion: repeated movements   Pain location:no change  Lying flexion: sustained positions  Pain location: centralized  Pain intensity: better  Pain level: decreased  Sitting flexion: sustained positions  Pain location: centralized  Pain intensity: better  Pain level: decreased  Standing extension: repeated movements  Pain location: peripheralized  Pain intensity: worse  Pain level: increased  Lying extension: sustained positions  Pain location: peripheralized  Pain intensity: worse  Pain level: increased    Strength/Myotome Testing     Lumbar   Left   Heel walk: normal  Toe walk: normal    Right   Heel walk: normal  Toe walk: normal    Left Hip   Planes of Motion   Flexion: 5  External rotation: 5  Internal rotation: 5    Right Hip   Planes of Motion   Flexion: 5  External rotation: 5  Internal rotation: 5    Left Knee   Flexion: 5  Extension: 5    Right Knee   Flexion: 5  Extension: 5    Left Ankle/Foot   Dorsiflexion: 5  Plantar flexion: 5  Great toe extension: 5    Right Ankle/Foot   Dorsiflexion: 5  Plantar flexion: 5  Great toe extension: 5    Additional Strength Details  SLS L: 4 seconds  SLS R: 3 seconds    Flexibility:  - HS: mild restriction on R, minimal on L  - Hip IR: mod restriction on R, severe on L  - Hip ER: no restriction B  - Hip extension: mod restriction B    Tests   Cervical   Negative vertical compression  Lumbar   Positive sacral spring   Negative vertical compression and SIJ compression  Left   Negative crossed SLR, passive SLR, quadrant and slump test      Right   Positive quadrant  Negative crossed SLR, passive SLR and slump test      Left Hip   Positive DAVIS  Right Hip   Negative DAVIS       Additional Tests Details  DAVIS (+) for hip pain only        Flowsheet Rows      Most Recent Value   PT/OT G-Codes   Current Score  33   Projected Score  61   Assessment Type  Evaluation             Precautions: thyroid disease      Manual  11-4 (IE)            STM To B QL and lumbar PVMs NV                                                                    Exercise Diary  11-4 (IE)            NuStep 6 mins NV                         Standing:             - hip flexor str B 4 x 30" ea NV                                                                             Seated:             - pball flexion 10 x 10" NV            - pball QL stretch 4 x 30" ea NV                         Table:             - Single knee to chest 10 x 10" ea NV            - double knee to chest on pball 10 x 10" NV            - Piriformis str 4 x 30" ea NV            - PPT 10 x 10" NV                                          Modalities  11-4 (IE)            Cryo as needed                                         * On initial evaluation, educated pt on anatomy, pathology, and exercise rationale  Educated pt on basic HEP and ensured proper exercise performance  Educated pt to call with any questions or concerns  Educated pt on signs and symptoms of cauda equina and to go to ER if symptoms are present

## 2019-11-04 NOTE — LETTER
2019    Jose Meyer MD  [address] [facility] [address]    Patient: Murray Garcia  YOB: 1955   Date of Visit: 2019      Dear Dr Pasha Valerio:    One of your patients, Murray Garcia, was referred to me by the [facility]'s Comprehensive Spine program   Please see the evaluation summary attached below  If care is required beyond 30 days to help your patient reach their goals, I will send you a request for signature on the plan of care  If you have any questions or concerns, please don't hesitate to call  Sincerely,    Leigh Ann Santamaria, PT      Primary Care Provider:      I certify that I have read the below Plan of Care and certify the need for these services furnished under this plan of treatment while under my care  Jose Meyer MD  [address] [facility] [address]: [phone]           PT Evaluation     Today's date: 2019  Patient name: Murray Garcia  : 1955  MRN: [id number]  Referring provider: Dawson Rinaldi MD  Dx:   Encounter Diagnosis     ICD-10-CM    1  Acute left-sided low back pain with bilateral sciatica M54 42 Ambulatory referral to PT spine    M54 41                   Assessment  Assessment details: Murray Garcia is a pleasant 71 y o  presenting to physical therapy with MD referral for Acute left-sided low back pain with bilateral sciatica  Patient's sensation and myotomes are intact with 1+ reflexes throughout BLEs  No increase in neural tension noted with testing  Problem list:  Limited lumbar AROM, decreased hip/core strength, limited lower extremity flexibility, poor balance, and slow gait  Treatment to include: Manual therapy techniques, lower extremity/core strengthening, neuromuscular control exercises, balance/proprioception training, flexion biased program, instruction in a comprehensive HEP, and modalities as needed       This pt would benefit from skilled PT services to address their impairments and functional limitations to maximize functional outcome  Symptom irritability: moderateBarriers to therapy: High BMI  Understanding of Dx/Px/POC: good   Prognosis: good  Prognosis details: obesity    Goals  ST  Pt will improve lumbar sidebend ROM to at least 15 degrees in 4 weeks  2  Pt will improve hip IR to no more than moderate restriction in 4 weeks  LT  Pt will be able to negotiate stairs with a reciprocal pattern with no more than mild pain in 8 weeks  2  Pt will be independent in a comprehensive HEP in 8 weeks  3  Pt will be able to walk > 20 minutes to facilitate grocery shopping with no more than mild discomfort in 8 weeks  Plan  Plan details: 2 x per week for 6-8 weeks  START BACK TOOL: High Risk (12-18 visits)  LUMBAR TREATMENT CLASSIFICATION: Flexion Biased program  Patient would benefit from: skilled physical therapy  Frequency: 2x week  Duration in weeks: 8  Treatment plan discussed with: patient        Subjective Evaluation    History of Present Illness  Mechanism of injury: Pt reports 2 weeks ago, he was lifting and landscaping  Pt states he lifted a 100# bag of cement and afterwards felt severe pain across his lower back  Pt reports he rested that day which did not help his symptoms  Pt reports two days later, he went to the ER where x-rays were performed of lower back and hip which revealed mild OA in hip and DDD  Pt was given an injection in the hospital which helped with his pain for about 8 hours  Pt was referred to comprehensive spine program  Pt reports since the injury, he was experiencing bilateral radicular symptoms every time he would try to stand (posterior thigh to posterior gastroc)  Pt reports he now experiences pain mainly down right leg to posterior gastrocnemius muscle  Pt reports he has noticed his urine stream has become less and he has become constipated since his lower back pain    Pt denies any saddle anesthesia, but reports a dull pain in his genitals over the past 1 5 weeks  Pt states he feels the change in his urine stream is due to dehydration  Pt reports pain is around his kidneys on the right side and the pain increases with a full bladder, but does not reduce with urination  Premorbid status:  - ADLs: Independent with no difficulty  - Work: Not a working individual- retired  - Recreation:walking for exercise occasionally  Current status:   - ADLs/Functional activities:    - Stairs Step to pattern with Pain Levels: moderate pain   - Sit to stand with moderate pain   - Walking 5 minutes prior to increase in radicular symptoms in right leg   - Standing 5 minutes prior to increase in pain in right lower back/right leg   - Sitting 30 minutes prior to increase in lumbar and leg pain  - Sleeping with 1 nightly sleep disturbances due to pain   - Bending forward to don/doff socks and shoes with moderate pain   - Lifting a few pieces of firewood with no increase in pain  - Work: Not a working individual- retired  - Recreation: none  Pain  Current pain ratin  At best pain ratin  At worst pain ratin  Location: Right posterior gastroc and right lower back  Quality: knife-like  Progression: improved    Treatments  Previous treatment: medication and injection treatment  Current treatment: physical therapy  Patient Goals  Patient goals for therapy: decreased pain, increased motion, increased strength, return to sport/leisure activities and independence with ADLs/IADLs          Objective     Postural Observations    Additional Postural Observation Details  Standing with lumbar spine flexed 20 degrees  Palpation   Left   Muscle spasm in the erector spinae and quadratus lumborum  Tenderness of the erector spinae and quadratus lumborum  Right   Muscle spasm in the erector spinae and quadratus lumborum  Tenderness of the erector spinae and quadratus lumborum       Additional Palpation Details  Increased tension in B piriformis muscles (R more painful than L)    Tenderness     Lumbar Spine  Tenderness in the facet joint (L3-L5 on R)  No tenderness in the left transverse process  Left Hip   No tenderness in the ASIS and PSIS  Right Hip   No tenderness in the ASIS and PSIS  Neurological Testing     Sensation     Lumbar   Left   Intact: light touch    Right   Intact: light touch    Reflexes   Left   Patellar (L4): trace (1+)  Achilles (S1): trace (1+)  Clonus sign: negative    Right   Patellar (L4): trace (1+)  Achilles (S1): trace (1+)  Clonus sign: negative    Active Range of Motion     Lumbar   Flexion: 70 degrees   Extension: 18 (Feeling of weakness in bilateral legs) degrees   Left lateral flexion: 7 degrees       Right lateral flexion: 10 degrees     Additional Active Range of Motion Details  No increase in tingling in bilateral legs with any lumbar motion      Joint Play     Hypomobile: L1, L2, L3, L4 and L5     Pain: L3 and L5   L3 comments: Increase in B radicular symptoms  L4 comments: Increase in B radicular symptoms  L5 comments: Increase in B radicular symptoms  Mechanical Assessment    Cervical      Thoracic      Lumbar    Standing flexion: repeated movements   Pain location:no change  Lying flexion: sustained positions  Pain location: centralized  Pain intensity: better  Pain level: decreased  Sitting flexion: sustained positions  Pain location: centralized  Pain intensity: better  Pain level: decreased  Standing extension: repeated movements  Pain location: peripheralized  Pain intensity: worse  Pain level: increased  Lying extension: sustained positions  Pain location: peripheralized  Pain intensity: worse  Pain level: increased    Strength/Myotome Testing     Lumbar   Left   Heel walk: normal  Toe walk: normal    Right   Heel walk: normal  Toe walk: normal    Left Hip   Planes of Motion   Flexion: 5  External rotation: 5  Internal rotation: 5    Right Hip   Planes of Motion   Flexion: 5  External rotation: 5  Internal rotation: 5    Left Knee   Flexion: 5  Extension: 5    Right Knee   Flexion: 5  Extension: 5    Left Ankle/Foot   Dorsiflexion: 5  Plantar flexion: 5  Great toe extension: 5    Right Ankle/Foot   Dorsiflexion: 5  Plantar flexion: 5  Great toe extension: 5    Additional Strength Details  SLS L: 4 seconds  SLS R: 3 seconds    Flexibility:  - HS: mild restriction on R, minimal on L  - Hip IR: mod restriction on R, severe on L  - Hip ER: no restriction B  - Hip extension: mod restriction B    Tests   Cervical   Negative vertical compression  Lumbar   Positive sacral spring   Negative vertical compression and SIJ compression  Left   Negative crossed SLR, passive SLR, quadrant and slump test      Right   Positive quadrant  Negative crossed SLR, passive SLR and slump test      Left Hip   Positive DAVIS  Right Hip   Negative DAVIS  Additional Tests Details  DAVIS (+) for hip pain only        Flowsheet Rows      Most Recent Value   PT/OT G-Codes   Current Score  33   Projected Score  61   Assessment Type  Evaluation             Precautions: thyroid disease      Manual  11-4 (IE)            STM To B QL and lumbar PVMs NV                                                                    Exercise Diary  11-4 (IE)            NuStep 6 mins NV                         Standing:             - hip flexor str B 4 x 30" ea NV                                                                             Seated:             - pball flexion 10 x 10" NV            - pball QL stretch 4 x 30" ea NV                         Table:             - Single knee to chest 10 x 10" ea NV            - double knee to chest on pball 10 x 10" NV            - Piriformis str 4 x 30" ea NV            - PPT 10 x 10" NV                                          Modalities  11-4 (IE)            Cryo as needed                                         * On initial evaluation, educated pt on anatomy, pathology, and exercise rationale   Educated pt on basic HEP and ensured proper exercise performance  Educated pt to call with any questions or concerns  Educated pt on signs and symptoms of cauda equina and to go to ER if symptoms are present

## 2019-11-07 ENCOUNTER — OFFICE VISIT (OUTPATIENT)
Dept: PHYSICAL THERAPY | Facility: CLINIC | Age: 64
End: 2019-11-07
Payer: MEDICARE

## 2019-11-07 DIAGNOSIS — M54.42 ACUTE LEFT-SIDED LOW BACK PAIN WITH BILATERAL SCIATICA: Primary | ICD-10-CM

## 2019-11-07 DIAGNOSIS — M54.41 ACUTE LEFT-SIDED LOW BACK PAIN WITH BILATERAL SCIATICA: Primary | ICD-10-CM

## 2019-11-07 PROCEDURE — 97110 THERAPEUTIC EXERCISES: CPT | Performed by: PHYSICAL THERAPIST

## 2019-11-07 RX ORDER — LEVOTHYROXINE SODIUM 0.1 MG/1
88 TABLET ORAL DAILY
COMMUNITY

## 2019-11-07 NOTE — PROGRESS NOTES
Daily Note     Today's date: 2019  Patient name: Franklyn Zheng  : 1955  MRN: [id number]  Referring provider: Catina Marcelino MD  Dx:   Encounter Diagnosis     ICD-10-CM    1  Acute left-sided low back pain with bilateral sciatica M54 42     M54 41                   Subjective: Patient reports he felt improvement in symptoms following initial evaluation; however, he continues to experience pain traveling down right leg at night which only resolves with medication  Objective: See treatment diary below      Assessment: Initiated a flexion biased exercise program this date with patient  Pt reported no pain at conclusion of session and declined manual therapy treatments due to no symptoms  Minimal verbal cues required to perform exercises with correct form and technique  Tolerated treatment well  Patient demonstrated fatigue post treatment and would benefit from continued PT  Provided pt with HEP this date  Plan: Progress treatment as tolerated         Precautions: thyroid disease      Manual  - (IE)            STM To B QL and lumbar PVMs NV Pt declinde                                                                   Exercise Diary   (IE)            NuStep 6 mins NV 7 mins, L 6                        Standing:             - hip flexor str B 4 x 30" ea NV 4 x 30" ea                                                                            Seated:             - pball flexion 10 x 10" NV 10 x 10"           - pball QL stretch 4 x 30" ea NV 4 x 30" ea                        Table:             - Single knee to chest 10 x 10" ea NV 10 x 10" ea           - double knee to chest on pball 10 x 10" NV            - Piriformis str 4 x 30" ea NV 4 x 30" ea           - PPT 10 x 10" NV 10 x 10"                                         Modalities  11-4 (IE)            Cryo as needed

## 2019-11-11 ENCOUNTER — OFFICE VISIT (OUTPATIENT)
Dept: PHYSICAL THERAPY | Facility: CLINIC | Age: 64
End: 2019-11-11
Payer: MEDICARE

## 2019-11-11 DIAGNOSIS — M54.41 ACUTE LEFT-SIDED LOW BACK PAIN WITH BILATERAL SCIATICA: Primary | ICD-10-CM

## 2019-11-11 DIAGNOSIS — M54.42 ACUTE LEFT-SIDED LOW BACK PAIN WITH BILATERAL SCIATICA: Primary | ICD-10-CM

## 2019-11-11 PROCEDURE — 97110 THERAPEUTIC EXERCISES: CPT | Performed by: PHYSICAL THERAPIST

## 2019-11-11 NOTE — PROGRESS NOTES
Daily Note     Today's date: 2019  Patient name: Haider Felix  : 1955  MRN: [id number]  Referring provider: Lizbeth Hobbs MD  Dx:   Encounter Diagnosis     ICD-10-CM    1  Acute left-sided low back pain with bilateral sciatica M54 42     M54 41                   Subjective: Patient reports he has not performed any of the exercises at home because he has been busy doing work at his home  Pt states he is interested in seeing a specialist at this time because he can still only stand for 5 minutes prior to increase in pain  Objective: See treatment diary below      Assessment: Educated pt on the importance of performing HEP consistently at home in attaining a timely improvement in his symptoms  Educated pt he can see a specialist at any time; however, I feel as though he should perform his HEP more consistently and try PT a little longer first  Pt reports mild pain in posterior hip on right at conclusion of session  Majority of symptoms resolved at conclusion of session  Pt continues to take increased time to perform exercises throughout session  Plan: Progress treatment as tolerated         Precautions: thyroid disease      Manual  11-4 (IE)           STM To B QL and lumbar PVMs NV Pt declinde Not needed                                                                  Exercise Diary  -4 (IE)           NuStep 6 mins NV 7 mins, L 6 9 mins, L5                       Standing:             - hip flexor str B 4 x 30" ea NV 4 x 30" ea 4 x 30" ea                                                                           Seated:             - pball flexion 10 x 10" NV 10 x 10" 10 x 10"          - pball QL stretch 4 x 30" ea NV 4 x 30" ea 4 x 30" ea                       Table:             - Single knee to chest 10 x 10" ea NV 10 x 10" ea 10 x 10" ea          - double knee to chest on pball 10 x 10" NV            - Piriformis str 4 x 30" ea NV 4 x 30" ea 4 x 30" Ea          - PPT 10 x 10" NV 10 x 10" 10 x 10"                                        Modalities  11-4 (IE)            Cryo as needed

## 2019-11-14 ENCOUNTER — APPOINTMENT (OUTPATIENT)
Dept: PHYSICAL THERAPY | Facility: CLINIC | Age: 64
End: 2019-11-14
Payer: MEDICARE

## 2019-11-14 NOTE — PROGRESS NOTES
Daily Note     Today's date: 2019  Patient name: Franklyn Zheng  : 1955  MRN: [id number]  Referring provider: Catina Marcelino MD  Dx: No diagnosis found  Subjective: ***      Objective: See treatment diary below      Assessment: Tolerated treatment {Tolerated treatment :9318220535}   Patient {assessment:[id number]}      Plan: {PLAN:}     Precautions: thyroid disease      Manual  - (IE)           STM To B QL and lumbar PVMs NV Pt declinde Not needed                                                                  Exercise Diary   (IE)           NuStep 6 mins NV 7 mins, L 6 9 mins, L5                       Standing:             - hip flexor str B 4 x 30" ea NV 4 x 30" ea 4 x 30" ea                                                                           Seated:             - pball flexion 10 x 10" NV 10 x 10" 10 x 10"          - pball QL stretch 4 x 30" ea NV 4 x 30" ea 4 x 30" ea                       Table:             - Single knee to chest 10 x 10" ea NV 10 x 10" ea 10 x 10" ea          - double knee to chest on pball 10 x 10" NV            - Piriformis str 4 x 30" ea NV 4 x 30" ea 4 x 30" Ea          - PPT 10 x 10" NV 10 x 10" 10 x 10"                                        Modalities  11- (IE)            Cryo as needed

## 2019-11-15 ENCOUNTER — OFFICE VISIT (OUTPATIENT)
Dept: PHYSICAL THERAPY | Facility: CLINIC | Age: 64
End: 2019-11-15
Payer: MEDICARE

## 2019-11-15 ENCOUNTER — APPOINTMENT (OUTPATIENT)
Dept: PHYSICAL THERAPY | Facility: CLINIC | Age: 64
End: 2019-11-15
Payer: MEDICARE

## 2019-11-15 DIAGNOSIS — M54.42 ACUTE LEFT-SIDED LOW BACK PAIN WITH BILATERAL SCIATICA: Primary | ICD-10-CM

## 2019-11-15 DIAGNOSIS — M54.41 ACUTE LEFT-SIDED LOW BACK PAIN WITH BILATERAL SCIATICA: Primary | ICD-10-CM

## 2019-11-15 PROCEDURE — 97112 NEUROMUSCULAR REEDUCATION: CPT | Performed by: PHYSICAL THERAPIST

## 2019-11-15 PROCEDURE — 97110 THERAPEUTIC EXERCISES: CPT | Performed by: PHYSICAL THERAPIST

## 2019-11-15 NOTE — PROGRESS NOTES
Daily Note     Today's date: 11/15/2019  Patient name: Glendy Coronel  : 1955  MRN: [id number]  Referring provider: Kwesi Carlton MD  Dx:   Encounter Diagnosis     ICD-10-CM    1  Acute left-sided low back pain with bilateral sciatica M54 42     M54 41                   Subjective: Feels slightly better  Feels good in the AM   In the afternoon pain starts in his leg  Feels better briefly with HEP, but pain returns quickly  Objective: See treatment diary below      Assessment: Tolerated treatment well  Patient would benefit from continued PT  Performed TE very slowly, however, he demonstrated good technique  States back felt much better with supine to sit transfer after performing TE  Plan: Continue per plan of care        Precautions: thyroid disease      Manual  -4 (IE)           STM To B QL and lumbar PVMs NV Pt declinde Not needed                                                                  Exercise Diary  -4 (IE) 11-7 11-11 11/15         NuStep 6 mins NV 7 mins, L 6 9 mins, L5                       Standing:             - hip flexor str B 4 x 30" ea NV 4 x 30" ea 4 x 30" ea 4 x 30" ea                                                                          Seated:             - pball flexion 10 x 10" NV 10 x 10" 10 x 10" 10 x 10"         - pball QL stretch 4 x 30" ea NV 4 x 30" ea 4 x 30" ea 10 x 10"                      Table:             - Single knee to chest 10 x 10" ea NV 10 x 10" ea 10 x 10" ea 10 x 10" ea         - double knee to chest on pball 10 x 10" NV   10 x 10"         - Piriformis str 4 x 30" ea NV 4 x 30" ea 4 x 30" Ea 4 x 30" Ea         - PPT 10 x 10" NV 10 x 10" 10 x 10" 10 x 10"                                       Modalities  11-4 (IE)            Cryo as needed

## 2019-11-18 ENCOUNTER — OFFICE VISIT (OUTPATIENT)
Dept: PHYSICAL THERAPY | Facility: CLINIC | Age: 64
End: 2019-11-18
Payer: MEDICARE

## 2019-11-18 DIAGNOSIS — M54.41 ACUTE LEFT-SIDED LOW BACK PAIN WITH BILATERAL SCIATICA: Primary | ICD-10-CM

## 2019-11-18 DIAGNOSIS — M54.42 ACUTE LEFT-SIDED LOW BACK PAIN WITH BILATERAL SCIATICA: Primary | ICD-10-CM

## 2019-11-18 PROCEDURE — 97110 THERAPEUTIC EXERCISES: CPT | Performed by: PHYSICAL THERAPIST

## 2019-11-18 NOTE — PROGRESS NOTES
Daily Note     Today's date: 2019  Patient name: Renald Goltz  : 1955  MRN: [id number]  Referring provider: Bautista Piedra MD  Dx:   Encounter Diagnosis     ICD-10-CM    1  Acute left-sided low back pain with bilateral sciatica M54 42     M54 41                   Subjective: Patient reports he is feeling much better than when he started PT  Pt reports less pain with stair negotiation and increased standing and walking tolerance  Pt states if he continues to feel better over the next few days he will cancel his appointment on Friday and discharge  Objective: See treatment diary below      Assessment: Pt required minimal verbal cues for correct reps/ hold times; otherwise, was able to perform all exercises with correct form and technique  Tolerated treatment well  Patient demonstrated fatigue post treatment, exhibited good technique with therapeutic exercises and would benefit from continued PT      Plan: Progress treatment as tolerated         Precautions: thyroid disease      Manual  11-4 (IE)           STM To B QL and lumbar PVMs NV Pt declinde Not needed                                                                  Exercise Diary  -4 (IE) 11-7 11-11 11/15 11-18        NuStep 6 mins NV 7 mins, L 6 9 mins, L5  10 mins, L5                     Standing:             - hip flexor str B 4 x 30" ea NV 4 x 30" ea 4 x 30" ea 4 x 30" ea 4 x 30" ea        - front step ups     NV        - lateral step up and overs     NV        - sit to stand     NV                                  Seated:             - pball flexion 10 x 10" NV 10 x 10" 10 x 10" 10 x 10" 10 x 10"        - pball QL stretch 4 x 30" ea NV 4 x 30" ea 4 x 30" ea 10 x 10" 2 x 30" ea                     Table:             - Single knee to chest 10 x 10" ea NV 10 x 10" ea 10 x 10" ea 10 x 10" ea 10 x 10" ea        - double knee to chest on pball 10 x 10" NV   10 x 10" 10 x 10"        - Piriformis str 4 x 30" ea NV 4 x 30" ea 4 x 30" Ea 4 x 30" Ea 4 x 30" Ea        - PPT 10 x 10" NV 10 x 10" 10 x 10" 10 x 10" 10 x 10"                                      Modalities  11-4 (IE)            Cryo as needed                                          * 1:1 time this date from 3:55pm- 4:19pm only

## 2019-11-21 ENCOUNTER — OFFICE VISIT (OUTPATIENT)
Dept: PHYSICAL THERAPY | Facility: CLINIC | Age: 64
End: 2019-11-21
Payer: MEDICARE

## 2019-11-21 DIAGNOSIS — M54.41 ACUTE LEFT-SIDED LOW BACK PAIN WITH BILATERAL SCIATICA: Primary | ICD-10-CM

## 2019-11-21 DIAGNOSIS — M54.42 ACUTE LEFT-SIDED LOW BACK PAIN WITH BILATERAL SCIATICA: Primary | ICD-10-CM

## 2019-11-21 PROCEDURE — 97110 THERAPEUTIC EXERCISES: CPT | Performed by: PHYSICAL THERAPIST

## 2019-11-21 PROCEDURE — 97530 THERAPEUTIC ACTIVITIES: CPT | Performed by: PHYSICAL THERAPIST

## 2019-11-21 NOTE — PROGRESS NOTES
Daily Note     Today's date: 2019  Patient name: Marcell Dotson  : 1955  MRN: [id number]  Referring provider: Lorenza Haro MD  Dx:   Encounter Diagnosis     ICD-10-CM    1  Acute left-sided low back pain with bilateral sciatica M54 42     M54 41                   Subjective: Patient reports he took a one mile walk and felt numbness in his leg  Pt states recently he has also been experiencing a cold sensation in bilateral feet  Objective: See treatment diary below      Assessment: Progressed exercises this date to include front and lateral step ups as well as sit to stand transfers  Pt reported onset of radicular symptoms down right leg with step ups which was resolved with seated flexion biased exercises  Tolerated treatment well  Patient demonstrated fatigue post treatment and would benefit from continued PT      Plan: Progress treatment as tolerated         Precautions: thyroid disease      Manual  11-4 (IE)           STM To B QL and lumbar PVMs NV Pt declinde Not needed                                                                  Exercise Diary  11-4 (IE) 11-7 11-11 11/15 11-18 11-21       NuStep 6 mins NV 7 mins, L 6 9 mins, L5  10 mins, L5 10 mins, L6                    Standing:             - hip flexor str B 4 x 30" ea NV 4 x 30" ea 4 x 30" ea 4 x 30" ea 4 x 30" ea        - front step ups     NV 10 x ea 6" step       - lateral step up and overs     NV 10 x ea 6" step       - sit to stand     NV 10 x                                 Seated:             - pball flexion 10 x 10" NV 10 x 10" 10 x 10" 10 x 10" 10 x 10" 10 x 10"       - pball QL stretch 4 x 30" ea NV 4 x 30" ea 4 x 30" ea 10 x 10" 2 x 30" ea 4 x 30" ea                    Table:             - Single knee to chest 10 x 10" ea NV 10 x 10" ea 10 x 10" ea 10 x 10" ea 10 x 10" ea        - double knee to chest on pball 10 x 10" NV   10 x 10" 10 x 10"        - Piriformis str 4 x 30" ea NV 4 x 30" ea 4 x 30" Ea 4 x 30" Ea 4 x 30" Ea seated 4 x 30" ea       - PPT 10 x 10" NV 10 x 10" 10 x 10" 10 x 10" 10 x 10"                                      Modalities  11-4 (IE)            Cryo as needed

## 2019-11-25 ENCOUNTER — OFFICE VISIT (OUTPATIENT)
Dept: PHYSICAL THERAPY | Facility: CLINIC | Age: 64
End: 2019-11-25
Payer: MEDICARE

## 2019-11-25 DIAGNOSIS — M54.42 ACUTE LEFT-SIDED LOW BACK PAIN WITH BILATERAL SCIATICA: Primary | ICD-10-CM

## 2019-11-25 DIAGNOSIS — M54.41 ACUTE LEFT-SIDED LOW BACK PAIN WITH BILATERAL SCIATICA: Primary | ICD-10-CM

## 2019-11-25 PROCEDURE — 97140 MANUAL THERAPY 1/> REGIONS: CPT | Performed by: PHYSICAL THERAPIST

## 2019-11-25 PROCEDURE — 97110 THERAPEUTIC EXERCISES: CPT | Performed by: PHYSICAL THERAPIST

## 2019-11-25 NOTE — PROGRESS NOTES
Daily Note     Today's date: 2019  Patient name: Dee Comer  : 1955  MRN: [id number]  Referring provider: Essie Blackwood MD  Dx:   Encounter Diagnosis     ICD-10-CM    1  Acute left-sided low back pain with bilateral sciatica M54 42     M54 41                   Subjective: Patient reports recently when he transfers sit to stand he has been experiencing numbness in his right leg  Pt states it feels like the medial aspect of bilateral feet are numb  Pt saw PCP who feels the cold sensation in his feet may be a pinched nereve  Objective: See treatment diary below      Assessment: Pt continues to present with increased pain with extension biased exercises  Pt presented with posterior innominate rotation this date which was resolved following MET  Pt reported reduction in symptoms at conclusion of session and less pain with walking  Plan: Progress treatment as tolerated         Precautions: thyroid disease      Manual  11-4 (IE)       STM To B QL and lumbar PVMs NV Pt declinde Not needed          MET to correct L post Innominate rotation       JG                                                 Exercise Diary  11-4 (IE) 11-7 11-11 11/15 11-18 11-21 11-25      NuStep 6 mins NV 7 mins, L 6 9 mins, L5  10 mins, L5 10 mins, L6 10 mins, L6                   Standing:             - hip flexor str B 4 x 30" ea NV 4 x 30" ea 4 x 30" ea 4 x 30" ea 4 x 30" ea        - front step ups     NV 10 x ea 6" step       - lateral step up and overs     NV 10 x ea 6" step       - sit to stand     NV 10 x                                 Seated:             - pball flexion 10 x 10" NV 10 x 10" 10 x 10" 10 x 10" 10 x 10" 10 x 10" 10 x 10"      - pball QL stretch 4 x 30" ea NV 4 x 30" ea 4 x 30" ea 10 x 10" 2 x 30" ea 4 x 30" ea 4 x 30" ea                   Table:             - Single knee to chest 10 x 10" ea NV 10 x 10" ea 10 x 10" ea 10 x 10" ea 10 x 10" ea  10 x 10" ea      - double knee to chest on pball 10 x 10" NV   10 x 10" 10 x 10"        - Piriformis str 4 x 30" ea NV 4 x 30" ea 4 x 30" Ea 4 x 30" Ea 4 x 30" Ea seated 4 x 30" ea       - PPT 10 x 10" NV 10 x 10" 10 x 10" 10 x 10" 10 x 10"  10 x 10"                                    Modalities  11-4 (IE)            Cryo as needed                                          * 1:1 time this date from 3:00pm- 3:30pm only

## 2019-12-02 ENCOUNTER — EVALUATION (OUTPATIENT)
Dept: PHYSICAL THERAPY | Facility: CLINIC | Age: 64
End: 2019-12-02
Payer: MEDICARE

## 2019-12-02 ENCOUNTER — TELEPHONE (OUTPATIENT)
Dept: PHYSICAL THERAPY | Facility: CLINIC | Age: 64
End: 2019-12-02

## 2019-12-02 DIAGNOSIS — M54.42 ACUTE LEFT-SIDED LOW BACK PAIN WITH BILATERAL SCIATICA: Primary | ICD-10-CM

## 2019-12-02 DIAGNOSIS — M54.41 ACUTE LEFT-SIDED LOW BACK PAIN WITH BILATERAL SCIATICA: Primary | ICD-10-CM

## 2019-12-02 PROCEDURE — 97530 THERAPEUTIC ACTIVITIES: CPT | Performed by: PHYSICAL THERAPIST

## 2019-12-02 PROCEDURE — 97110 THERAPEUTIC EXERCISES: CPT | Performed by: PHYSICAL THERAPIST

## 2019-12-02 NOTE — TELEPHONE ENCOUNTER
Called pt to confirm today's appointment; however, pt did not answer call and his voicemail box was not set up

## 2019-12-02 NOTE — PROGRESS NOTES
PT Re-Evaluation  and PT Discharge    Today's date: 2019  Patient name: Venkat Wyman  : 1955  MRN: [id number]  Referring provider: Jose J Lara MD  Dx:   Encounter Diagnosis     ICD-10-CM    1  Acute left-sided low back pain with bilateral sciatica M54 42     M54 41                   Assessment  Assessment details: Venkat Wyman is a pleasant 71 y o  presenting to physical therapy with MD referral for Acute left-sided low back pain with bilateral sciatica  Since time of initial evaluation, pt has made good improvements in lumbar ROM and some improvements in lower extremity flexibility  Pt was responding well to a flexion-biased program; however, pt now reports constant numbness in right foot and recent onset of anal numbness with change in bowel/bladder control (pt has notified PCP of these findings)  Pt had an MRI pf lumbar spine performed on Friday, 19, with no results at this time  Numbness/tingling was not altered with flexion, extension or manual traction this date  At this point in time, pt will be discharged to an independent Hep and referred back to PCP for further medical evaluation  Symptom irritability: moderateBarriers to therapy: High BMI  Understanding of Dx/Px/POC: good   Prognosis: good  Prognosis details: obesity    Goals  ST  Pt will improve lumbar sidebend ROM to at least 15 degrees in 4 weeks  PARTIALLY MET  2  Pt will improve hip IR to no more than moderate restriction in 4 weeks  NOT MET    LT  Pt will be able to negotiate stairs with a reciprocal pattern with no more than mild pain in 8 weeks  MET  2  Pt will be independent in a comprehensive HEP in 8 weeks  MET  3  Pt will be able to walk > 20 minutes to facilitate grocery shopping with no more than mild discomfort in 8 weeks   NOT MET    Plan  Plan details: Discharge to an independent Hep with follow up to PCP with concerns of bowel/bladder changes and lack of response to flexion, extension, or traction to reduce numbness/tingling in foot  START BACK TOOL: High Risk (12-18 visits)  LUMBAR TREATMENT CLASSIFICATION: Flexion Biased program  Treatment plan discussed with: patient        Subjective Evaluation    History of Present Illness  Mechanism of injury: Pt reports 2 weeks ago, he was lifting and landscaping  Pt states he lifted a 100# bag of cement and afterwards felt severe pain across his lower back  Pt reports he rested that day which did not help his symptoms  Pt reports two days later, he went to the ER where x-rays were performed of lower back and hip which revealed mild OA in hip and DDD  Pt was given an injection in the hospital which helped with his pain for about 8 hours  Pt was referred to comprehensive spine program  Pt reports since the injury, he was experiencing bilateral radicular symptoms every time he would try to stand (posterior thigh to posterior gastroc)  Pt reports he now experiences pain mainly down right leg to posterior gastrocnemius muscle  Pt reports he has noticed his urine stream has become less and he has become constipated since his lower back pain  Pt denies any saddle anesthesia, but reports a dull pain in his genitals over the past 1 5 weeks  Pt states he feels the change in his urine stream is due to dehydration  Pt reports pain is around his kidneys on the right side and the pain increases with a full bladder, but does not reduce with urination  Premorbid status:  - ADLs: Independent with no difficulty  - Work: Not a working individual- retired  - Recreation:walking for exercise occasionally      Current status:   - ADLs/Functional activities:    - Stairs Reciprocal pattern with Pain Levels: no pain, rather fatigue (improved)   - Sit to stand with no pain (improved)   - Walking 5 minutes prior to increase in radicular symptoms in right leg   - Standing 5 minutes prior to increase in pain in right lower back/right leg   - Sitting unlimited without increase in pain (improved)   - Sleeping with 1 nightly sleep disturbances due to pain   - Bending forward to don/doff socks and shoes with mild pain (improved)   - Lifting a few pieces of firewood with no increase in pain  - Work: Not a working individual- retired  - Recreation: none    Since time of initial evaluation, pt feels as though he is back to 75% of his premorbid status  Pt reports he is now experiencing constant numbness in right foot and reports increased numbness down right leg after standing 5 minutes  Pt states he has now noticed a change in his bowel/bladder and some numbness in "anal canal" which is PCP is aware of  Pt states he had an MRI of lumbar spine on Friday; however, does not yet know the results  Pain  Current pain rating: 3  At best pain ratin  At worst pain ratin  Location: Right posterior gastroc and right lower back  Quality: knife-like  Progression: improved    Treatments  Previous treatment: medication and injection treatment  Current treatment: physical therapy  Patient Goals  Patient goals for therapy: decreased pain, increased motion, increased strength, return to sport/leisure activities and independence with ADLs/IADLs          Objective     Postural Observations    Additional Postural Observation Details  Standing with lumbar spine flexed 20 degrees  Palpation   Left   Muscle spasm in the erector spinae and quadratus lumborum  Tenderness of the erector spinae and quadratus lumborum  Right   Muscle spasm in the erector spinae and quadratus lumborum  Tenderness of the erector spinae and quadratus lumborum  Additional Palpation Details  Increased tension in B piriformis muscles (R more painful than L)    Tenderness     Lumbar Spine  Tenderness in the facet joint (L3-L5 on R)  No tenderness in the left transverse process  Left Hip   No tenderness in the ASIS and PSIS  Right Hip   No tenderness in the ASIS and PSIS       Neurological Testing     Sensation     Lumbar Left   Intact: light touch    Right   Intact: light touch    Reflexes   Left   Patellar (L4): trace (1+)  Achilles (S1): trace (1+)  Clonus sign: negative    Right   Patellar (L4): trace (1+)  Achilles (S1): trace (1+)  Clonus sign: negative    Active Range of Motion     Lumbar   Flexion: 90 degrees   Extension: 18 degrees   Left lateral flexion: 12 degrees       Right lateral flexion: 12 degrees     Additional Active Range of Motion Details  No increase in tingling in bilateral legs with any lumbar motion  Joint Play     Hypomobile: L1, L2, L3, L4 and L5   Mechanical Assessment    Cervical      Thoracic      Lumbar    Lying flexion: sustained positions  Pain location: no change  Sitting flexion: sustained positions  Pain location: no change  Lying extension: sustained positions  Pain location: no change    Strength/Myotome Testing     Lumbar   Left   Heel walk: normal  Toe walk: normal    Right   Heel walk: normal  Toe walk: normal    Left Hip   Planes of Motion   Flexion: 5  External rotation: 5  Internal rotation: 5    Right Hip   Planes of Motion   Flexion: 5  External rotation: 5  Internal rotation: 5    Left Knee   Flexion: 5  Extension: 5    Right Knee   Flexion: 5  Extension: 5    Left Ankle/Foot   Dorsiflexion: 5  Plantar flexion: 5  Great toe extension: 5    Right Ankle/Foot   Dorsiflexion: 5  Plantar flexion: 5  Great toe extension: 5    Additional Strength Details  SLS L: 4 seconds  SLS R: 5 seconds    Flexibility:  - HS: minimal restriction on R, minimal on L  - Hip IR: severe restriction B  - Hip ER: no restriction B  - Hip extension: mod restriction B    Tests   Cervical   Negative vertical compression  Lumbar   Positive sacral spring   Negative vertical compression and SIJ compression  Left   Negative crossed SLR, passive SLR, quadrant and slump test      Right   Positive slump test    Negative crossed SLR, passive SLR and quadrant  Left Hip   Negative DAVIS       Right Hip Negative DAVIS       Additional Tests Details                 Precautions: thyroid disease        Manual  11-4 (IE) 11-7 11-11 11-25         STM To B QL and lumbar PVMs NV Pt declinde Not needed                 MET to correct L post Innominate rotation             JG                                                                                       Exercise Diary  11-4 (IE) 11-7 11-11 11/15 11-18 11-21 11-25  12-2 (RE)       NuStep 6 mins NV 7 mins, L 6 9 mins, L5   10 mins, L5 10 mins, L6 10 mins, L6  10 mins, L6                               Standing:                       - hip flexor str B 4 x 30" ea NV 4 x 30" ea 4 x 30" ea 4 x 30" ea 4 x 30" ea             - front step ups         NV 10 x ea 6" step    10 x ea 6" step       - lateral step up and overs         NV 10 x ea 6" step    10 x ea 6" step       - sit to stand         NV 10 x    10 x                                                       Seated:                       - pball flexion 10 x 10" NV 10 x 10" 10 x 10" 10 x 10" 10 x 10" 10 x 10" 10 x 10"  10 x 10"       - pball QL stretch 4 x 30" ea NV 4 x 30" ea 4 x 30" ea 10 x 10" 2 x 30" ea 4 x 30" ea 4 x 30" ea  2 x 30" ea                               Table:                       - Single knee to chest 10 x 10" ea NV 10 x 10" ea 10 x 10" ea 10 x 10" ea 10 x 10" ea   10 x 10" ea         - double knee to chest on pball 10 x 10" NV     10 x 10" 10 x 10"             - Piriformis str 4 x 30" ea NV 4 x 30" ea 4 x 30" Ea 4 x 30" Ea 4 x 30" Ea seated 4 x 30" ea           - PPT 10 x 10" NV 10 x 10" 10 x 10" 10 x 10" 10 x 10"   10 x 10"  10 x 10"                                                             Modalities  11-4 (IE)                     Cryo as needed

## 2019-12-05 ENCOUNTER — APPOINTMENT (OUTPATIENT)
Dept: PHYSICAL THERAPY | Facility: CLINIC | Age: 64
End: 2019-12-05
Payer: MEDICARE

## 2019-12-07 ENCOUNTER — HOSPITAL ENCOUNTER (EMERGENCY)
Facility: HOSPITAL | Age: 64
Discharge: HOME | End: 2019-12-07
Attending: EMERGENCY MEDICINE | Admitting: EMERGENCY MEDICINE
Payer: MEDICARE

## 2019-12-07 VITALS
SYSTOLIC BLOOD PRESSURE: 137 MMHG | HEART RATE: 84 BPM | TEMPERATURE: 97.5 F | RESPIRATION RATE: 18 BRPM | HEIGHT: 67 IN | DIASTOLIC BLOOD PRESSURE: 70 MMHG | OXYGEN SATURATION: 95 % | BODY MASS INDEX: 39.24 KG/M2 | WEIGHT: 250 LBS

## 2019-12-07 DIAGNOSIS — S61.012A LACERATION OF LEFT THUMB WITHOUT FOREIGN BODY WITHOUT DAMAGE TO NAIL, INITIAL ENCOUNTER: Primary | ICD-10-CM

## 2019-12-07 PROCEDURE — 12001 RPR S/N/AX/GEN/TRNK 2.5CM/<: CPT | Performed by: EMERGENCY MEDICINE

## 2019-12-07 PROCEDURE — 99282 EMERGENCY DEPT VISIT SF MDM: CPT | Performed by: EMERGENCY MEDICINE

## 2019-12-07 PROCEDURE — 99282 EMERGENCY DEPT VISIT SF MDM: CPT

## 2019-12-07 RX ORDER — LIDOCAINE HYDROCHLORIDE AND EPINEPHRINE 20; 5 MG/ML; UG/ML
1 INJECTION, SOLUTION EPIDURAL; INFILTRATION; INTRACAUDAL; PERINEURAL ONCE
Status: DISCONTINUED | OUTPATIENT
Start: 2019-12-07 | End: 2019-12-07

## 2019-12-07 RX ORDER — LIDOCAINE HYDROCHLORIDE AND EPINEPHRINE 20; 5 MG/ML; UG/ML
20 INJECTION, SOLUTION EPIDURAL; INFILTRATION; INTRACAUDAL; PERINEURAL ONCE
Status: COMPLETED | OUTPATIENT
Start: 2019-12-07 | End: 2019-12-07

## 2019-12-07 RX ORDER — LIDOCAINE 40 MG/G
CREAM TOPICAL ONCE
Status: COMPLETED | OUTPATIENT
Start: 2019-12-07 | End: 2019-12-07

## 2019-12-07 RX ADMIN — LIDOCAINE HYDROCHLORIDE AND EPINEPHRINE 20 ML: 20; 5 INJECTION, SOLUTION EPIDURAL; INFILTRATION; INTRACAUDAL; PERINEURAL at 16:04

## 2019-12-07 RX ADMIN — LIDOCAINE 4%: 4 CREAM TOPICAL at 15:56

## 2019-12-07 NOTE — DISCHARGE INSTRUCTIONS
Have your stitches taken out in 5-7 days or be seen sooner if you think the cut is infected or not healing properly

## 2019-12-07 NOTE — ED PROVIDER NOTES
History  Chief Complaint   Patient presents with    Finger Laceration     pt presents with left thumb laceration     Pt cut L thumb on clean knife just prior to arrival  Bleeding controlled  Normal function  Normal sensation  Not anticoagulated  No other injuries or concerns  Tetanus UTD  Prior to Admission Medications   Prescriptions Last Dose Informant Patient Reported? Taking?   levothyroxine 100 mcg tablet   Yes No   Sig: Take 88 mcg by mouth daily      Facility-Administered Medications: None       Past Medical History:   Diagnosis Date    Anxiety     pt denies on PT eval on 11-4-19    Depression     pt denies on PT eval on 11-4-19    Disease of thyroid gland     No pertinent past medical history        Past Surgical History:   Procedure Laterality Date    BREAST LUMPECTOMY      1997    TONSILLECTOMY         Family History   Problem Relation Age of Onset    Hypertension Father     Stroke Father      I have reviewed and agree with the history as documented  Social History     Tobacco Use    Smoking status: Never Smoker    Smokeless tobacco: Never Used   Substance Use Topics    Alcohol use: Yes     Alcohol/week: 2 0 standard drinks     Types: 2 Cans of beer per week     Frequency: 2-3 times a week     Drinks per session: 3 or 4     Comment: occ    Drug use: Not Currently     Types: Marijuana     Comment: occ        Review of Systems   Constitutional: Negative for chills and fever  Skin: Positive for wound  Neurological: Negative for weakness and numbness  All other systems reviewed and are negative  Physical Exam  Physical Exam   Constitutional: He is oriented to person, place, and time  He appears well-developed and well-nourished  No distress  HENT:   Head: Normocephalic and atraumatic  Eyes: Pupils are equal, round, and reactive to light  Conjunctivae and EOM are normal  Right eye exhibits no discharge  Left eye exhibits no discharge  Neck: Normal range of motion   Neck supple  No JVD present  Pulmonary/Chest: No stridor  Musculoskeletal: Normal range of motion  He exhibits no edema or deformity  1 5cm linear laceration L thumb  Intact ROM and perfusion L thumb  Bleeding resolved  Neurological: He is alert and oriented to person, place, and time  No cranial nerve deficit or sensory deficit  He exhibits normal muscle tone  Coordination normal    Skin: Skin is warm and dry  Capillary refill takes less than 2 seconds  No rash noted  He is not diaphoretic  No erythema  No pallor  Nursing note and vitals reviewed  Vital Signs  ED Triage Vitals [12/07/19 1542]   Temperature Pulse Respirations Blood Pressure SpO2   97 5 °F (36 4 °C) 84 18 137/70 95 %      Temp src Heart Rate Source Patient Position - Orthostatic VS BP Location FiO2 (%)   -- -- -- -- --      Pain Score       No Pain           Vitals:    12/07/19 1542   BP: 137/70   Pulse: 84         Visual Acuity      ED Medications  Medications   lidocaine (LMX) 4 % cream ( Topical Given 12/7/19 2426)   lidocaine-epinephrine (XYLOCAINE-MPF/EPINEPHRINE) 2 %-1:200,000 injection 20 mL (20 mL Infiltration Given 12/7/19 1604)       Diagnostic Studies  Results Reviewed     None                 No orders to display              Procedures  Laceration repair  Date/Time: 12/7/2019 4:26 PM  Performed by: Kenna Pike MD  Authorized by: Kenna Pike MD   Consent: Verbal consent obtained    Risks and benefits: risks, benefits and alternatives were discussed  Consent given by: patient  Body area: upper extremity  Location details: left thumb  Laceration length: 1 5 cm  Foreign bodies: no foreign bodies    Anesthesia:  Local Anesthetic: lidocaine 2% with epinephrine    Wound Dehiscence:  Superficial Wound Dehiscence: simple closure      Procedure Details:  Irrigation solution: tap water  Skin closure: 5-0 nylon  Number of sutures: 3  Technique: simple  Patient tolerance: Patient tolerated the procedure well with no immediate complications               ED Course                               MDM      Disposition  Final diagnoses:   Laceration of left thumb without foreign body without damage to nail, initial encounter     Time reflects when diagnosis was documented in both MDM as applicable and the Disposition within this note     Time User Action Codes Description Comment    12/7/2019  4:14 PM Johana Gabriel Add [S61 012A] Laceration of left thumb without foreign body without damage to nail, initial encounter       ED Disposition     ED Disposition Condition Date/Time Comment    Discharge Stable Sat Dec 7, 2019  4:14 PM Karishma Omer discharge to home/self care  Follow-up Information    None         Discharge Medication List as of 12/7/2019  4:14 PM      CONTINUE these medications which have NOT CHANGED    Details   levothyroxine 100 mcg tablet Take 88 mcg by mouth daily, Historical Med      clotrimazole (LOTRIMIN) 1 % cream Apply topically 2 (two) times a day, Starting Thu 6/7/2018, Print      lidocaine (LIDODERM) 5 % Apply 1 patch topically daily Remove & Discard patch within 12 hours or as directed by MD, Starting Mon 10/28/2019, Print      mupirocin (BACTROBAN) 2 % cream Apply topically 3 (three) times a day, Starting Thu 6/7/2018, Print      naproxen (NAPROSYN) 500 mg tablet Take 1 tablet (500 mg total) by mouth 2 (two) times a day with meals, Starting Mon 10/28/2019, Print      promethazine-dextromethorphan (PHENERGAN-DM) 6 25-15 mg/5 mL oral syrup Take 5 mL by mouth 4 (four) times a day as needed for cough, Starting Wed 11/28/2018, Normal      pyrithione zinc (HEAD AND SHOULDERS) 1 % shampoo Apply topically daily as needed for dandruff, Starting Thu 6/7/2018, Print           No discharge procedures on file      ED Provider  Electronically Signed by           Justin Wyman MD  12/07/19 9828

## 2019-12-27 ENCOUNTER — TRANSCRIBE ORDERS (OUTPATIENT)
Dept: NEUROSURGERY | Facility: CLINIC | Age: 64
End: 2019-12-27

## 2019-12-27 DIAGNOSIS — M54.50 LOWER BACK PAIN: Primary | ICD-10-CM

## 2020-01-06 ENCOUNTER — CONSULT (OUTPATIENT)
Dept: NEUROSURGERY | Facility: CLINIC | Age: 65
End: 2020-01-06
Payer: MEDICARE

## 2020-01-06 VITALS
BODY MASS INDEX: 39.71 KG/M2 | HEART RATE: 93 BPM | SYSTOLIC BLOOD PRESSURE: 130 MMHG | TEMPERATURE: 99.1 F | RESPIRATION RATE: 16 BRPM | HEIGHT: 67 IN | DIASTOLIC BLOOD PRESSURE: 71 MMHG | WEIGHT: 253 LBS

## 2020-01-06 DIAGNOSIS — G83.4 COMPRESSION OF CAUDA EQUINA DUE TO STENOSIS OF LUMBAR SPINE (HCC): Primary | ICD-10-CM

## 2020-01-06 DIAGNOSIS — M54.50 LOWER BACK PAIN: ICD-10-CM

## 2020-01-06 DIAGNOSIS — M48.062 NEUROGENIC CLAUDICATION DUE TO LUMBAR SPINAL STENOSIS: ICD-10-CM

## 2020-01-06 DIAGNOSIS — M48.061 COMPRESSION OF CAUDA EQUINA DUE TO STENOSIS OF LUMBAR SPINE (HCC): Primary | ICD-10-CM

## 2020-01-06 PROCEDURE — 99204 OFFICE O/P NEW MOD 45 MIN: CPT | Performed by: NEUROLOGICAL SURGERY

## 2020-01-06 RX ORDER — CEFAZOLIN SODIUM 2 G/50ML
2000 SOLUTION INTRAVENOUS ONCE
Status: CANCELLED | OUTPATIENT
Start: 2020-01-06 | End: 2020-01-06

## 2020-01-06 RX ORDER — CHLORHEXIDINE GLUCONATE 0.12 MG/ML
15 RINSE ORAL ONCE
Status: CANCELLED | OUTPATIENT
Start: 2020-01-06 | End: 2020-01-06

## 2020-01-06 NOTE — PROGRESS NOTES
Assessment/Plan:    No problem-specific Assessment & Plan notes found for this encounter  Problem List Items Addressed This Visit     None      Visit Diagnoses     Compression of cauda equina due to stenosis of lumbar spine ([address] )    -  Primary    Relevant Orders    Case request operating room: LAMINECTOMY LUMBAR/THORACIC: Right L3/4 lumbar laminectomy decompression (Completed)    Ambulatory referral to Internal Medicine    Lower back pain        Relevant Orders    Ambulatory referral to Internal Medicine    Neurogenic claudication due to lumbar spinal stenosis        Relevant Orders    Case request operating room: LAMINECTOMY LUMBAR/THORACIC: Right L3/4 lumbar laminectomy decompression (Completed)    Ambulatory referral to Internal Medicine            Subjective:      Patient ID: Keren Bond is a 71 y o  male  HPI    The following portions of the patient's history were reviewed and updated as appropriate:   He  has a past medical history of Anxiety, Depression, Disease of thyroid gland, and No pertinent past medical history  He   Patient Active Problem List    Diagnosis Date Noted    BPH with urinary obstruction 08/26/2019     He  has a past surgical history that includes Breast lumpectomy and Tonsillectomy  His family history includes Hypertension in his father; Stroke in his father  He  reports that he has never smoked  He has never used smokeless tobacco  He reports that he drinks about 2 0 standard drinks of alcohol per week  He reports that he has current or past drug history  Drug: Marijuana  Current Outpatient Medications   Medication Sig Dispense Refill    levothyroxine 100 mcg tablet Take 88 mcg by mouth daily       No current facility-administered medications for this visit  Current Outpatient Medications on File Prior to Visit   Medication Sig    levothyroxine 100 mcg tablet Take 88 mcg by mouth daily     No current facility-administered medications on file prior to visit        He has No Known Allergies       Review of Systems   Constitutional: Negative  HENT: Negative  Eyes: Negative  Respiratory: Negative  Cardiovascular: Negative  Gastrointestinal: Negative  Endocrine: Negative  Genitourinary: Positive for frequency and urgency  Musculoskeletal: Positive for back pain (to Right leg) and gait problem  Skin: Negative  Allergic/Immunologic: Negative  Neurological: Positive for weakness (B/L legs) and numbness (R>L legs when standing)  Hematological: Does not bruise/bleed easily  Psychiatric/Behavioral: Negative for sleep disturbance  Objective:      /71 (BP Location: Left arm, Patient Position: Sitting, Cuff Size: Standard)   Pulse 93   Temp 99 1 °F (37 3 °C) (Tympanic)   Resp 16   Ht 5' 7" (1 702 m)   Wt 115 kg (253 lb)   BMI 39 63 kg/m²          Physical Exam      I have personally obtain history and examined patient  I have personally reviewed case including all pertinent investigations/studies  Time spent 60 minutes  More than 50% of total time spent on counseling and coordination of care as described above including patient education, discussion of risks and rationale of conservative vs surgical treatment options  HPI    4 months hx of acute back pain, bilateral R>L leg pain with paraesthesia and possible R>L perineal numbness (colonoscopy related?) after lifting bag of cement  Pain worse with extension, relieved with flexion  Denies urinary / bowel symptoms  PT completed without durable benefit       Exam    Full strength bilateral LE  Negative SLR  Reduced sensation in nonradicular distribution R>L  Severe paravertebral spasm  anatalgic gait  Able to complete heel and toe walk with difficulty  Unsteady tandem gait                        Back:     Symmetric, no curvature, ROM reduced, no CVA tenderness       Chest wall:    No tenderness or deformity                   Extremities:   Extremities normal, atraumatic, no cyanosis or edema       Skin:   Skin color, texture, turgor normal, no rashes or lesions     Radiology    MRI    Multilevel lumbar disc degeneration with large right paracentral L3/4 disc herniation/extrusion in the setting ligamentum flavum overgrowth/facet arthropathy causing severe concentric compression of overlying cauda equina structures    Summary and plan    Mr Rukhsana Deutsch has subacute back pain and bilateral LE paraesthesia with possible partial saddle sensory changes in the setting of severe cauda equina compression  He has tried medications and pt without significant benefit  Given the progression of symptoms, I am concerned about his evolving symptoms in the setting of cauda equina involvement  We reviewed the signs and symptoms of cauda equina syndrome including weakness, numbness, pain, saddle anesthesia, loss of bowel and bladder control  He understands that he is to present to the nearest ED and contact my office if this transpires  The risk of a lumbar laminectomy decompression includes but is not limited to neurological deficit, csf leak, infection, bleeding, bowel/bladder deficit, ongoing pain and disability  Mr Rukhsana Deutsch understands and has provided informed consent for surgery  He requires medical clearance  We will make every effort to expedite his surgery

## 2020-01-07 ENCOUNTER — OFFICE VISIT (OUTPATIENT)
Dept: LAB | Facility: HOSPITAL | Age: 65
End: 2020-01-07
Attending: NEUROLOGICAL SURGERY
Payer: MEDICARE

## 2020-01-07 DIAGNOSIS — M48.061 COMPRESSION OF CAUDA EQUINA DUE TO STENOSIS OF LUMBAR SPINE (HCC): ICD-10-CM

## 2020-01-07 DIAGNOSIS — M48.062 NEUROGENIC CLAUDICATION DUE TO LUMBAR SPINAL STENOSIS: ICD-10-CM

## 2020-01-07 DIAGNOSIS — G83.4 COMPRESSION OF CAUDA EQUINA DUE TO STENOSIS OF LUMBAR SPINE (HCC): ICD-10-CM

## 2020-01-07 DIAGNOSIS — Z01.818 PRE-PROCEDURAL EXAMINATION: ICD-10-CM

## 2020-01-07 LAB
ALBUMIN SERPL BCP-MCNC: 3.5 G/DL (ref 3.5–5)
ALP SERPL-CCNC: 58 U/L (ref 46–116)
ALT SERPL W P-5'-P-CCNC: 24 U/L (ref 12–78)
ANION GAP SERPL CALCULATED.3IONS-SCNC: 5 MMOL/L (ref 4–13)
APTT PPP: 43 SECONDS (ref 23–37)
AST SERPL W P-5'-P-CCNC: 12 U/L (ref 5–45)
ATRIAL RATE: 66 BPM
BASOPHILS # BLD AUTO: 0.05 THOUSANDS/ΜL (ref 0–0.1)
BASOPHILS NFR BLD AUTO: 1 % (ref 0–1)
BILIRUB SERPL-MCNC: 1 MG/DL (ref 0.2–1)
BUN SERPL-MCNC: 16 MG/DL (ref 5–25)
CALCIUM SERPL-MCNC: 8.9 MG/DL (ref 8.3–10.1)
CHLORIDE SERPL-SCNC: 104 MMOL/L (ref 100–108)
CO2 SERPL-SCNC: 33 MMOL/L (ref 21–32)
CREAT SERPL-MCNC: 0.99 MG/DL (ref 0.6–1.3)
EOSINOPHIL # BLD AUTO: 0.38 THOUSAND/ΜL (ref 0–0.61)
EOSINOPHIL NFR BLD AUTO: 6 % (ref 0–6)
ERYTHROCYTE [DISTWIDTH] IN BLOOD BY AUTOMATED COUNT: 14 % (ref 11.6–15.1)
EST. AVERAGE GLUCOSE BLD GHB EST-MCNC: 114 MG/DL
GFR SERPL CREATININE-BSD FRML MDRD: 80 ML/MIN/1.73SQ M
GLUCOSE P FAST SERPL-MCNC: 90 MG/DL (ref 65–99)
HBA1C MFR BLD: 5.6 % (ref 4.2–6.3)
HCT VFR BLD AUTO: 44.7 % (ref 36.5–49.3)
HGB BLD-MCNC: 14.2 G/DL (ref 12–17)
IMM GRANULOCYTES # BLD AUTO: 0.03 THOUSAND/UL (ref 0–0.2)
IMM GRANULOCYTES NFR BLD AUTO: 1 % (ref 0–2)
INR PPP: 1.02 (ref 0.84–1.19)
LYMPHOCYTES # BLD AUTO: 1.69 THOUSANDS/ΜL (ref 0.6–4.47)
LYMPHOCYTES NFR BLD AUTO: 26 % (ref 14–44)
MCH RBC QN AUTO: 30.3 PG (ref 26.8–34.3)
MCHC RBC AUTO-ENTMCNC: 31.8 G/DL (ref 31.4–37.4)
MCV RBC AUTO: 96 FL (ref 82–98)
MONOCYTES # BLD AUTO: 0.57 THOUSAND/ΜL (ref 0.17–1.22)
MONOCYTES NFR BLD AUTO: 9 % (ref 4–12)
NEUTROPHILS # BLD AUTO: 3.73 THOUSANDS/ΜL (ref 1.85–7.62)
NEUTS SEG NFR BLD AUTO: 57 % (ref 43–75)
NRBC BLD AUTO-RTO: 0 /100 WBCS
P AXIS: 30 DEGREES
PLATELET # BLD AUTO: 206 THOUSANDS/UL (ref 149–390)
PMV BLD AUTO: 10.9 FL (ref 8.9–12.7)
POTASSIUM SERPL-SCNC: 3.8 MMOL/L (ref 3.5–5.3)
PR INTERVAL: 176 MS
PROT SERPL-MCNC: 7.1 G/DL (ref 6.4–8.2)
PROTHROMBIN TIME: 13.4 SECONDS (ref 11.6–14.5)
QRS AXIS: 21 DEGREES
QRSD INTERVAL: 90 MS
QT INTERVAL: 414 MS
QTC INTERVAL: 434 MS
RBC # BLD AUTO: 4.68 MILLION/UL (ref 3.88–5.62)
SODIUM SERPL-SCNC: 142 MMOL/L (ref 136–145)
T WAVE AXIS: 35 DEGREES
VENTRICULAR RATE: 66 BPM
WBC # BLD AUTO: 6.45 THOUSAND/UL (ref 4.31–10.16)

## 2020-01-07 PROCEDURE — 93010 ELECTROCARDIOGRAM REPORT: CPT | Performed by: INTERNAL MEDICINE

## 2020-01-07 PROCEDURE — 85730 THROMBOPLASTIN TIME PARTIAL: CPT

## 2020-01-07 PROCEDURE — 85610 PROTHROMBIN TIME: CPT

## 2020-01-07 PROCEDURE — 83036 HEMOGLOBIN GLYCOSYLATED A1C: CPT

## 2020-01-07 PROCEDURE — 80053 COMPREHEN METABOLIC PANEL: CPT

## 2020-01-07 PROCEDURE — 36415 COLL VENOUS BLD VENIPUNCTURE: CPT

## 2020-01-07 PROCEDURE — 85025 COMPLETE CBC W/AUTO DIFF WBC: CPT

## 2020-01-07 PROCEDURE — 93005 ELECTROCARDIOGRAM TRACING: CPT

## 2020-01-14 ENCOUNTER — TELEPHONE (OUTPATIENT)
Dept: CASE MANAGEMENT | Facility: HOSPITAL | Age: 65
End: 2020-01-14

## 2020-01-14 NOTE — TELEPHONE ENCOUNTER
Cm called patient per request from Pre Admission Testing nurse d/t patient not having transport after his upcoming procedure 2/18/2020  Patient's plan is to go to the [facility] by [address] [phone]) following his procedure and return home the next day  CM discussed logistics of dcp from procedure and developed the plan below with the patient  · Patient will check into the hotel prior to arriving [facility] for his procedure the morning of 2/18/2020  · Once patient arrives, CM dept should be contacted at ext  [phone] to discuss surgery timeline and approximate d/c time for patient  · CM will set up w/c transport from PACU to the hotel for once patient is cleared for d/c  (OOP cost discussed with patient and he is agreeable )  · CM will set up VNA for patient at his residence (not the hotel) following his procedure   (Patient states he does not have a [place] [phone] agency preference ) (skilled nursing and PT for post op assessment and care, therapy eval/treat in home )

## 2020-08-03 ENCOUNTER — TRANSCRIBE ORDERS (OUTPATIENT)
Dept: ADMINISTRATIVE | Facility: HOSPITAL | Age: 65
End: 2020-08-03

## 2020-08-03 ENCOUNTER — APPOINTMENT (OUTPATIENT)
Dept: LAB | Facility: MEDICAL CENTER | Age: 65
End: 2020-08-03
Payer: MEDICARE

## 2020-08-03 DIAGNOSIS — E03.9 HYPOTHYROIDISM, UNSPECIFIED TYPE: Primary | ICD-10-CM

## 2020-08-03 DIAGNOSIS — Z13.0 SCREENING FOR IRON DEFICIENCY ANEMIA: ICD-10-CM

## 2020-08-03 DIAGNOSIS — E78.2 MIXED HYPERLIPIDEMIA: ICD-10-CM

## 2020-08-03 DIAGNOSIS — Z12.5 SPECIAL SCREENING FOR MALIGNANT NEOPLASM OF PROSTATE: ICD-10-CM

## 2020-08-03 DIAGNOSIS — Z13.29 SCREENING FOR THYROID DISORDER: ICD-10-CM

## 2020-08-03 DIAGNOSIS — D50.9 IRON DEFICIENCY ANEMIA, UNSPECIFIED IRON DEFICIENCY ANEMIA TYPE: ICD-10-CM

## 2020-08-03 LAB
ALBUMIN SERPL BCP-MCNC: 3.5 G/DL (ref 3.5–5)
ALP SERPL-CCNC: 53 U/L (ref 46–116)
ALT SERPL W P-5'-P-CCNC: 31 U/L (ref 12–78)
ANION GAP SERPL CALCULATED.3IONS-SCNC: 3 MMOL/L (ref 4–13)
AST SERPL W P-5'-P-CCNC: 21 U/L (ref 5–45)
BILIRUB SERPL-MCNC: 1.39 MG/DL (ref 0.2–1)
BUN SERPL-MCNC: 12 MG/DL (ref 5–25)
CALCIUM SERPL-MCNC: 9.2 MG/DL (ref 8.3–10.1)
CHLORIDE SERPL-SCNC: 106 MMOL/L (ref 100–108)
CHOLEST SERPL-MCNC: 139 MG/DL (ref 50–200)
CO2 SERPL-SCNC: 30 MMOL/L (ref 21–32)
CREAT SERPL-MCNC: 0.9 MG/DL (ref 0.6–1.3)
ERYTHROCYTE [DISTWIDTH] IN BLOOD BY AUTOMATED COUNT: 13.6 % (ref 11.6–15.1)
GFR SERPL CREATININE-BSD FRML MDRD: 89 ML/MIN/1.73SQ M
GLUCOSE P FAST SERPL-MCNC: 84 MG/DL (ref 65–99)
HCT VFR BLD AUTO: 45.3 % (ref 36.5–49.3)
HDLC SERPL-MCNC: 28 MG/DL
HGB BLD-MCNC: 14.5 G/DL (ref 12–17)
LDLC SERPL CALC-MCNC: 91 MG/DL (ref 0–100)
LDLC SERPL DIRECT ASSAY-MCNC: 84 MG/DL (ref 0–100)
MCH RBC QN AUTO: 29.9 PG (ref 26.8–34.3)
MCHC RBC AUTO-ENTMCNC: 32 G/DL (ref 31.4–37.4)
MCV RBC AUTO: 93 FL (ref 82–98)
NONHDLC SERPL-MCNC: 111 MG/DL
PLATELET # BLD AUTO: 209 THOUSANDS/UL (ref 149–390)
PMV BLD AUTO: 11.9 FL (ref 8.9–12.7)
POTASSIUM SERPL-SCNC: 3.9 MMOL/L (ref 3.5–5.3)
PROT SERPL-MCNC: 6.6 G/DL (ref 6.4–8.2)
PSA SERPL-MCNC: 0.3 NG/ML (ref 0–4)
RBC # BLD AUTO: 4.85 MILLION/UL (ref 3.88–5.62)
SODIUM SERPL-SCNC: 139 MMOL/L (ref 136–145)
TRIGL SERPL-MCNC: 98 MG/DL
TSH SERPL DL<=0.05 MIU/L-ACNC: 18.7 UIU/ML (ref 0.36–3.74)
WBC # BLD AUTO: 6.42 THOUSAND/UL (ref 4.31–10.16)

## 2020-08-03 PROCEDURE — 84443 ASSAY THYROID STIM HORMONE: CPT | Performed by: INTERNAL MEDICINE

## 2020-08-03 PROCEDURE — 36415 COLL VENOUS BLD VENIPUNCTURE: CPT | Performed by: INTERNAL MEDICINE

## 2020-08-03 PROCEDURE — 80053 COMPREHEN METABOLIC PANEL: CPT | Performed by: INTERNAL MEDICINE

## 2020-08-03 PROCEDURE — G0103 PSA SCREENING: HCPCS

## 2020-08-03 PROCEDURE — 83721 ASSAY OF BLOOD LIPOPROTEIN: CPT | Performed by: INTERNAL MEDICINE

## 2020-08-03 PROCEDURE — 80061 LIPID PANEL: CPT | Performed by: INTERNAL MEDICINE

## 2020-08-03 PROCEDURE — 85027 COMPLETE CBC AUTOMATED: CPT | Performed by: INTERNAL MEDICINE

## 2021-03-24 ENCOUNTER — TRANSCRIBE ORDERS (OUTPATIENT)
Dept: ADMINISTRATIVE | Facility: HOSPITAL | Age: 66
End: 2021-03-24

## 2021-03-24 DIAGNOSIS — R07.9 CHEST PAIN, UNSPECIFIED TYPE: Primary | ICD-10-CM

## 2021-04-06 ENCOUNTER — HOSPITAL ENCOUNTER (OUTPATIENT)
Dept: NON INVASIVE DIAGNOSTICS | Facility: CLINIC | Age: 66
Discharge: HOME | End: 2021-04-06
Payer: MEDICARE

## 2021-04-06 ENCOUNTER — TRANSCRIBE ORDERS (OUTPATIENT)
Dept: NON INVASIVE DIAGNOSTICS | Facility: CLINIC | Age: 66
End: 2021-04-06

## 2021-04-06 DIAGNOSIS — R07.89 ATYPICAL CHEST PAIN: Primary | ICD-10-CM

## 2021-04-06 DIAGNOSIS — R07.89 ATYPICAL CHEST PAIN: ICD-10-CM

## 2021-04-06 LAB
MAX DIASTOLIC BP: 68 MMHG
MAX HEART RATE: 88 BPM
MAX PREDICTED HEART RATE: 154 BPM
MAX. SYSTOLIC BP: 116 MMHG
PROTOCOL NAME: NORMAL
REASON FOR TERMINATION: NORMAL
TARGET HR FORMULA: NORMAL
TEST INDICATION: NORMAL
TIME IN EXERCISE PHASE: NORMAL

## 2021-04-06 PROCEDURE — 93016 CV STRESS TEST SUPVJ ONLY: CPT | Performed by: INTERNAL MEDICINE

## 2021-04-06 PROCEDURE — 93018 CV STRESS TEST I&R ONLY: CPT | Performed by: INTERNAL MEDICINE

## 2021-04-06 PROCEDURE — 78452 HT MUSCLE IMAGE SPECT MULT: CPT

## 2021-04-06 PROCEDURE — 78452 HT MUSCLE IMAGE SPECT MULT: CPT | Performed by: INTERNAL MEDICINE

## 2021-04-06 PROCEDURE — 93017 CV STRESS TEST TRACING ONLY: CPT

## 2021-04-06 PROCEDURE — G1004 CDSM NDSC: HCPCS

## 2021-04-06 PROCEDURE — A9502 TC99M TETROFOSMIN: HCPCS

## 2021-04-06 RX ADMIN — REGADENOSON 0.4 MG: 0.08 INJECTION, SOLUTION INTRAVENOUS at 08:51

## 2021-04-20 ENCOUNTER — HOSPITAL ENCOUNTER (OUTPATIENT)
Dept: NON INVASIVE DIAGNOSTICS | Facility: CLINIC | Age: 66
Discharge: HOME | End: 2021-04-20
Payer: MEDICARE

## 2021-04-20 DIAGNOSIS — R07.89 ATYPICAL CHEST PAIN: ICD-10-CM

## 2021-04-20 PROCEDURE — 93306 TTE W/DOPPLER COMPLETE: CPT

## 2021-04-20 PROCEDURE — 93306 TTE W/DOPPLER COMPLETE: CPT | Performed by: INTERNAL MEDICINE

## 2021-06-07 ENCOUNTER — HOSPITAL ENCOUNTER (EMERGENCY)
Facility: HOSPITAL | Age: 66
Discharge: HOME | End: 2021-06-07
Attending: EMERGENCY MEDICINE
Payer: MEDICARE

## 2021-06-07 VITALS
TEMPERATURE: 98 F | DIASTOLIC BLOOD PRESSURE: 62 MMHG | WEIGHT: 225 LBS | RESPIRATION RATE: 20 BRPM | HEART RATE: 74 BPM | SYSTOLIC BLOOD PRESSURE: 114 MMHG | BODY MASS INDEX: 35.31 KG/M2 | OXYGEN SATURATION: 94 % | HEIGHT: 67 IN
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
## 2021-06-07 PROCEDURE — 99284 EMERGENCY DEPT VISIT MOD MDM: CPT | Performed by: PHYSICIAN ASSISTANT

## 2021-06-07 PROCEDURE — 99281 EMR DPT VST MAYX REQ PHY/QHP: CPT

## 2021-06-07 RX ORDER — DOXYCYCLINE HYCLATE 100 MG/1
100 CAPSULE ORAL ONCE
Status: COMPLETED | OUTPATIENT
Start: 2021-06-07 | End: 2021-06-07

## 2021-06-07 RX ORDER — DOXYCYCLINE HYCLATE 100 MG/1
100 CAPSULE ORAL 2 TIMES DAILY
Qty: 28 CAPSULE | Refills: 0 | Status: SHIPPED | OUTPATIENT
Start: 2021-06-07 | End: 2021-06-21

## 2021-06-07 RX ADMIN — DOXYCYCLINE 100 MG: 100 CAPSULE ORAL at 21:22

## 2021-06-08 NOTE — ED PROVIDER NOTES
History  Chief Complaint   Patient presents with    Insect Bite     Patient reports noticed bug bite under L arm 36 hours ago  Itching and reddness  Patient is a 58-year-old male with a past medical history significant for anxiety, depression who presents to the emergency department for evaluation of an insect she gets left arm about 3 hours ago  Patient states that yesterday he was out gardening and got bit by a lot of insects  Patient states that the insect bite underneath his left arm is itchy, red, swollen  Patient is unsure of what he was bit by  Patient states that it could have been a tick  Patient is not having any other complaints at this time  He denies any fevers, chills, chest pain, difficulty breathing, abdominal pain, nausea, vomiting, diarrhea, arthralgias, myalgias  Prior to Admission Medications   Prescriptions Last Dose Informant Patient Reported? Taking?   levothyroxine 100 mcg tablet 6/7/2021 at Unknown time  Yes Yes   Sig: Take 88 mcg by mouth daily      Facility-Administered Medications: None       Past Medical History:   Diagnosis Date    Anxiety     pt denies on PT eval on 11-4-19    Depression     pt denies on PT eval on 11-4-19    Disease of thyroid gland     No pertinent past medical history        Past Surgical History:   Procedure Laterality Date    BREAST LUMPECTOMY      1997    COLONOSCOPY      TONSILLECTOMY         Family History   Problem Relation Age of Onset    Hypertension Father     Stroke Father      I have reviewed and agree with the history as documented  E-Cigarette/Vaping     E-Cigarette/Vaping Substances     Social History     Tobacco Use    Smoking status: Never Smoker    Smokeless tobacco: Never Used   Substance Use Topics    Alcohol use:  Yes     Alcohol/week: 2 0 standard drinks     Types: 2 Cans of beer per week     Frequency: 2-3 times a week     Drinks per session: 3 or 4     Comment: occ    Drug use: Not Currently     Types: Marijuana     Comment: occ       Review of Systems   Constitutional: Negative for chills, diaphoresis and fever  HENT: Negative for congestion, drooling, facial swelling, nosebleeds, sore throat and voice change  Eyes: Negative for discharge and redness  Respiratory: Negative for cough, choking, chest tightness, shortness of breath and stridor  Cardiovascular: Negative for chest pain and palpitations  Gastrointestinal: Negative for abdominal pain, diarrhea, nausea and vomiting  Genitourinary: Negative for decreased urine volume, difficulty urinating, dysuria, flank pain, frequency and urgency  Musculoskeletal: Negative for arthralgias, back pain, neck pain and neck stiffness  Skin: Positive for rash (Insect bite underneath left arm  Itchy, red, swollen)  Negative for color change and wound  Neurological: Negative for dizziness, syncope, facial asymmetry, weakness, light-headedness, numbness and headaches  Psychiatric/Behavioral: Negative for confusion and suicidal ideas  The patient is not nervous/anxious  All other systems reviewed and are negative  Physical Exam  Physical Exam  Vitals signs reviewed  Constitutional:       General: He is not in acute distress  Appearance: Normal appearance  He is normal weight  He is not ill-appearing, toxic-appearing or diaphoretic  HENT:      Head: Normocephalic and atraumatic  Right Ear: External ear normal       Left Ear: External ear normal       Mouth/Throat:      Mouth: Mucous membranes are moist       Pharynx: Oropharynx is clear  No oropharyngeal exudate or posterior oropharyngeal erythema  Eyes:      General: No scleral icterus  Right eye: No discharge  Left eye: No discharge  Extraocular Movements: Extraocular movements intact  Conjunctiva/sclera: Conjunctivae normal       Pupils: Pupils are equal, round, and reactive to light  Neck:      Musculoskeletal: Normal range of motion and neck supple  Cardiovascular:      Rate and Rhythm: Normal rate and regular rhythm  Pulses: Normal pulses  Heart sounds: Normal heart sounds  No murmur  No friction rub  No gallop  Pulmonary:      Effort: Pulmonary effort is normal  No respiratory distress  Breath sounds: Normal breath sounds  No stridor  No wheezing, rhonchi or rales  Abdominal:      General: Abdomen is flat  Palpations: Abdomen is soft  Tenderness: There is no abdominal tenderness  There is no right CVA tenderness, left CVA tenderness, guarding or rebound  Musculoskeletal: Normal range of motion  Right lower leg: No edema  Left lower leg: No edema  Skin:     General: Skin is warm and dry  Capillary Refill: Capillary refill takes less than 2 seconds  Findings: Rash (Rash on the left tricep, possibly erythema migrans from tick bite ) present  Neurological:      General: No focal deficit present  Mental Status: He is alert and oriented to person, place, and time  Psychiatric:         Mood and Affect: Mood normal          Behavior: Behavior normal          Vital Signs  ED Triage Vitals [06/07/21 2050]   Temperature Pulse Respirations Blood Pressure SpO2   98 °F (36 7 °C) 74 20 114/62 94 %      Temp src Heart Rate Source Patient Position - Orthostatic VS BP Location FiO2 (%)   -- -- -- -- --      Pain Score       --           Vitals:    06/07/21 2050   BP: 114/62   Pulse: 74         Visual Acuity      ED Medications  Medications   doxycycline hyclate (VIBRAMYCIN) capsule 100 mg (100 mg Oral Given 6/7/21 2122)       Diagnostic Studies  Results Reviewed     None                 No orders to display              Procedures  Procedures         ED Course                             SBIRT 22yo+      Most Recent Value   SBIRT (22 yo +)   In order to provide better care to our patients, we are screening all of our patients for alcohol and drug use  Would it be okay to ask you these screening questions?   Yes Filed at: 06/07/2021 2116   Initial Alcohol Screen: US AUDIT-C    1  How often do you have a drink containing alcohol?  0 Filed at: 06/07/2021 2116   2  How many drinks containing alcohol do you have on a typical day you are drinking? 0 Filed at: 06/07/2021 2116   3a  Male UNDER 65: How often do you have five or more drinks on one occasion? 0 Filed at: 06/07/2021 2116   Audit-C Score  0 Filed at: 06/07/2021 2116   ILDEFONSO: How many times in the past year have you    Used an illegal drug or used a prescription medication for non-medical reasons? Never Filed at: 06/07/2021 2116                    MDM  Number of Diagnoses or Management Options  Bug bite with infection, initial encounter:   Diagnosis management comments: Patient is a 25-year-old male with a past medical history significant for anxiety, depression who presents to the emergency department for evaluation of an insect she gets left arm about 3 hours ago  Patient states that yesterday he was out gardening and got bit by a lot of insects  Patient states that the insect bite underneath his left arm is itchy, red, swollen  Patient is unsure of what he was bit by  Patient states that it could have been a tick  Patient is not having any other complaints at this time  He denies any fevers, chills, chest pain, difficulty breathing, abdominal pain, nausea, vomiting, diarrhea, arthralgias, myalgias  Patient appears comfortable in bed, he is not in any acute distress  His vital signs are normal   He does have a rash underneath his left triceps that is erythematous and raise  The rash is similar to erythema migrans, however there is no central clearing  Due to patient being unaware of what he was bit by, patient will be treated with doxycycline as it will cover both Lyme disease as well as skin and soft tissue infection  Patient was given a dose of doxycycline in the emergency department  He was given a prescription for doxycycline to go home with  Patient was given instructions to follow-up with his primary care provider  Patient was given very strict instructions on when to return to the emergency department      Patient Progress  Patient progress: stable      Disposition  Final diagnoses:   Bug bite with infection, initial encounter     Time reflects when diagnosis was documented in both MDM as applicable and the Disposition within this note     Time User Action Codes Description Comment    6/7/2021  9:21 PM Gray Polio  XXXA] Bug bite with infection, initial encounter       ED Disposition     ED Disposition Condition Date/Time Comment    Discharge Stable Mon Jun 7, 2021  9:21 PM Mimi Red discharge to home/self care  Follow-up Information     Follow up With Specialties Details Why Contact Info Additional [address] [facility] Emergency Department Emergency Medicine Go to  If symptoms worsen [address] Emergency Department, [address] [place], [place], [address]    Jenifer Cao MD Internal Medicine Schedule an appointment as soon as possible for a visit  for follow up [id number] [place] [phone]             Discharge Medication List as of 6/7/2021  9:22 PM      START taking these medications    Details   doxycycline hyclate (VIBRAMYCIN) 100 mg capsule Take 1 capsule (100 mg total) by mouth 2 (two) times a day for 14 days, Starting Mon 6/7/2021, Until Mon 6/21/2021, Normal         CONTINUE these medications which have NOT CHANGED    Details   levothyroxine 100 mcg tablet Take 88 mcg by mouth daily, Historical Med           No discharge procedures on file      PDMP Review     None          ED Provider  Electronically Signed by           José Rivera PA-C  06/08/21 9537

## 2021-08-04 ENCOUNTER — OFFICE VISIT (OUTPATIENT)
Dept: GASTROENTEROLOGY | Facility: CLINIC | Age: 66
End: 2021-08-04
Payer: MEDICARE

## 2021-08-04 VITALS
SYSTOLIC BLOOD PRESSURE: 98 MMHG | BODY MASS INDEX: 35.63 KG/M2 | HEIGHT: 67 IN | WEIGHT: 227 LBS | DIASTOLIC BLOOD PRESSURE: 60 MMHG | HEART RATE: 72 BPM

## 2021-08-04 DIAGNOSIS — K59.1 FUNCTIONAL DIARRHEA: Primary | ICD-10-CM

## 2021-08-04 DIAGNOSIS — R14.0 BLOATING: ICD-10-CM

## 2021-08-04 DIAGNOSIS — Z86.010 HISTORY OF COLON POLYPS: ICD-10-CM

## 2021-08-04 PROCEDURE — 99214 OFFICE O/P EST MOD 30 MIN: CPT | Performed by: INTERNAL MEDICINE

## 2021-08-04 NOTE — PROGRESS NOTES
[facility] - Outpatient Follow-up Note  Prudencio Dunn 76 y o  male MRN: [id number]  Encounter: [id number]          ASSESSMENT AND PLAN:      1  Functional diarrhea    2  Bloating    3  History of colon polyps    Discontinue apricots     if stopping apricots isn't helpful,  Then begin fiber supplementation twice daily     no additional procedures warranted at this time    ______________________________________________________________________    SUBJECTIVETerence Skill returns to the office today with a complaint of growling of the stomach and passing a lot of gas  He also states that he has been experiencing diarrhea up to 3 times daily  He states that the stools look like fried chicken  He denies heartburn or dysphagia  He denies any rectal bleeding or melena  His last colonoscopy 2 years ago demonstrated 9 polyps and he will be due for repeat colonoscopy in September of 2022  He indicates that he started eating dried apricots about 1 month ago and has been eating them on a daily basis  He states that after he started eating the apricots as when he is began to experience the growling of the stomach, the diarrhea, and the abdominal pain  REVIEW OF SYSTEMS IS OTHERWISE NEGATIVE        Historical Information   Past Medical History:   Diagnosis Date    Anxiety     pt denies on PT eval on 11-4-19    Depression     pt denies on PT eval on 11-4-19    Disease of thyroid gland     No pertinent past medical history      Past Surgical History:   Procedure Laterality Date    BREAST LUMPECTOMY      1997    COLONOSCOPY      TONSILLECTOMY       Social History   Social History     Substance and Sexual Activity   Alcohol Use Yes    Alcohol/week: 2 0 standard drinks    Types: 2 Cans of beer per week    Comment: occ     Social History     Substance and Sexual Activity   Drug Use Not Currently    Types: Marijuana    Comment: occ     Social History     Tobacco Use   Smoking Status Never Smoker   Smokeless Tobacco Never Used     Family History   Problem Relation Age of Onset    Hypertension Father     Stroke Father        Meds/Allergies       Current Outpatient Medications:     levothyroxine 100 mcg tablet    Allergies   Allergen Reactions    Other Swelling     Bee stings             Objective     Blood pressure 98/60, pulse 72, height 5' 7" (1 702 m), weight 103 kg (227 lb)  Body mass index is 35 55 kg/m²  PHYSICAL EXAM:      General Appearance:   Alert, cooperative, no distress   HEENT:   Normocephalic, atraumatic, anicteric      Neck:  Supple, symmetrical, trachea midline   Lungs:   Clear to auscultation bilaterally; no rales, rhonchi or wheezing; respirations unlabored    Heart[de-identified]   Regular rate and rhythm; no murmur, rub, or gallop  Abdomen:   Soft, non-tender, non-distended; normal bowel sounds; no masses, no organomegaly    Genitalia:   Deferred    Rectal:   Deferred    Extremities:  No cyanosis, clubbing or edema    Pulses:  2+ and symmetric    Skin:  No jaundice, rashes, or lesions    Lymph nodes:  No palpable cervical lymphadenopathy        Lab Results:   No visits with results within 1 Day(s) from this visit  Latest known visit with results is:   Hospital Outpatient Visit on 04/06/2021   Component Date Value    Protocol Name 04/06/2021 LEXISCAN-SIT     Time In Exercise Phase 04/06/2021 00:03:00     MAX  SYSTOLIC BP 92/43/5132 517     Max Diastolic Bp 98/91/7323 68     Max Heart Rate 04/06/2021 88     Max Predicted Heart Rate 04/06/2021 154     Reason for Termination 04/06/2021 Protocol Complete     Test Indication 04/06/2021 CHEST PAIN     Target Hr Formular 04/06/2021 (220 - Age)*85%          Radiology Results:   No results found

## 2021-09-20 ENCOUNTER — OFFICE VISIT (OUTPATIENT)
Dept: URGENT CARE | Facility: CLINIC | Age: 66
End: 2021-09-20
Payer: MEDICARE

## 2021-09-20 VITALS
BODY MASS INDEX: 35.55 KG/M2 | OXYGEN SATURATION: 97 % | SYSTOLIC BLOOD PRESSURE: 108 MMHG | WEIGHT: 227 LBS | RESPIRATION RATE: 16 BRPM | DIASTOLIC BLOOD PRESSURE: 62 MMHG | HEART RATE: 64 BPM | TEMPERATURE: 97.9 F

## 2021-09-20 DIAGNOSIS — L98.9 SKIN LESION: Primary | ICD-10-CM

## 2021-09-20 PROCEDURE — G0463 HOSPITAL OUTPT CLINIC VISIT: HCPCS | Performed by: PHYSICIAN ASSISTANT

## 2021-09-20 PROCEDURE — 99213 OFFICE O/P EST LOW 20 MIN: CPT | Performed by: PHYSICIAN ASSISTANT

## 2021-09-20 NOTE — PROGRESS NOTES
[facility]        NAME: Mannie Anderson is a 76 y o  male  : 1955    MRN: [id number]  DATE: 2021  TIME: 9:16 AM    Assessment and Plan   Skin lesion [L98 9]  1  Skin lesion  Ambulatory referral to Dermatology         Patient Instructions     Patient Instructions   Follow up with derm           Follow up with PCP in 3-5 days  Proceed to  ER if symptoms worsen  Chief Complaint     Chief Complaint   Patient presents with    Skin Problem     pt concerned for skin cancer lesions on LFA         History of Present Illness       The pt is a 67-year-old male presenting with a lesion on his LFA  He is worried for skin CA  The lesion has been there a long time according to the pt  Review of Systems   Review of Systems   Constitutional: Negative for activity change, appetite change, chills, diaphoresis and fever  HENT: Negative for congestion, rhinorrhea and sore throat  Respiratory: Negative for cough, chest tightness and shortness of breath  Cardiovascular: Negative for chest pain and palpitations  Gastrointestinal: Negative for abdominal pain, diarrhea, nausea and vomiting  Musculoskeletal: Negative for arthralgias and myalgias  Skin: Positive for color change  Negative for pallor  Neurological: Negative for headaches           Current Medications       Current Outpatient Medications:     levothyroxine 100 mcg tablet, Take 88 mcg by mouth daily, Disp: , Rfl:     Current Allergies     Allergies as of 2021 - Reviewed 2021   Allergen Reaction Noted    Other Swelling 2020            The following portions of the patient's history were reviewed and updated as appropriate: allergies, current medications, past family history, past medical history, past social history, past surgical history and problem list      Past Medical History:   Diagnosis Date    Anxiety     pt denies on PT eval on 19    Depression     pt denies on PT eval on 19    Disease of thyroid gland     No pertinent past medical history        Past Surgical History:   Procedure Laterality Date    BREAST LUMPECTOMY      1997    COLONOSCOPY      TONSILLECTOMY         Family History   Problem Relation Age of Onset    Hypertension Father     Stroke Father          Medications have been verified  Objective   /62   Pulse 64   Temp 97 9 °F (36 6 °C) (Temporal)   Resp 16   Wt 103 kg (227 lb)   SpO2 97%   BMI 35 55 kg/m²        Physical Exam     Physical Exam  Vitals reviewed  Constitutional:       General: He is not in acute distress  Appearance: Normal appearance  He is normal weight  He is not ill-appearing, toxic-appearing or diaphoretic  Musculoskeletal:         General: Normal range of motion  Skin:     General: Skin is warm  Capillary Refill: Capillary refill takes less than 2 seconds  Comments: Brown spot hard to the touch on the pt's LFA   Many freckles all over the pts body    Neurological:      Mental Status: He is alert

## 2022-02-10 ENCOUNTER — OFFICE VISIT (OUTPATIENT)
Dept: DERMATOLOGY | Facility: CLINIC | Age: 67
End: 2022-02-10
Payer: MEDICARE

## 2022-02-10 VITALS — WEIGHT: 225 LBS | HEIGHT: 67 IN | BODY MASS INDEX: 35.31 KG/M2 | TEMPERATURE: 98.3 F

## 2022-02-10 DIAGNOSIS — Z13.89 SCREENING FOR SKIN CONDITION: ICD-10-CM

## 2022-02-10 DIAGNOSIS — L82.1 SEBORRHEIC KERATOSIS: Primary | ICD-10-CM

## 2022-02-10 DIAGNOSIS — L98.9 SKIN LESION: ICD-10-CM

## 2022-02-10 PROCEDURE — 99202 OFFICE O/P NEW SF 15 MIN: CPT | Performed by: DERMATOLOGY

## 2022-02-10 NOTE — PROGRESS NOTES
[facility] DERMATOLOGY  [address] [place] [id number]  [phone]  [phone]     MRN: [id number] : 1955  Encounter: [id number]  Patient Information: Hilton Rios  Chief complaint:  Yearly checkup lesion on left forearm  History of present illness:  Lesion on left forearm  Past Medical History:   Diagnosis Date    Anxiety     pt denies on PT eval on 19    Depression     pt denies on PT eval on 19    Disease of thyroid gland     No pertinent past medical history      Past Surgical History:   Procedure Laterality Date    BREAST LUMPECTOMY          COLONOSCOPY      TONSILLECTOMY       Social History   Social History     Substance and Sexual Activity   Alcohol Use Yes    Alcohol/week: 2 0 standard drinks    Types: 2 Cans of beer per week    Comment: occ     Social History     Substance and Sexual Activity   Drug Use Not Currently    Types: Marijuana    Comment: occ     Social History     Tobacco Use   Smoking Status Never Smoker   Smokeless Tobacco Never Used     Family History   Problem Relation Age of Onset    Hypertension Father     Stroke Father      Meds/Allergies   Allergies   Allergen Reactions    Other Swelling     Bee stings         Meds:  Prior to Admission medications    Medication Sig Start Date End Date Taking?  Authorizing Provider   levothyroxine 100 mcg tablet Take 88 mcg by mouth daily   Yes Historical Provider, MD       Subjective:     Review of Systems:    General: negative for - chills, fatigue, fever,  weight gain or weight loss  Psychological: negative for - anxiety, behavioral disorder, concentration difficulties, decreased libido, depression, irritability, memory difficulties, mood swings, sleep disturbances or suicidal ideation  ENT: negative for - hearing difficulties , nasal congestion, nasal discharge, oral lesions, sinus pain, sneezing, sore throat  Allergy and Immunology: negative for - hives, insect bite sensitivity,  Hematological and Lymphatic: negative for - bleeding problems, blood clots,bruising, swollen lymph nodes  Endocrine: negative for - hair pattern changes, hot flashes, malaise/lethargy, mood swings, palpitations, polydipsia/polyuria, skin changes, temperature intolerance or unexpected weight change  Respiratory: negative for - cough, hemoptysis, orthopnea, shortness of breath, or wheezing  Cardiovascular: negative for - chest pain, dyspnea on exertion, edema,  Gastrointestinal: negative for - abdominal pain, nausea/vomiting  Genito-Urinary: negative for - dysuria, incontinence, irregular/heavy menses or urinary frequency/urgency  Musculoskeletal: negative for - gait disturbance, joint pain, joint stiffness, joint swelling, muscle pain, muscular weakness  Dermatological:  As in HPI  Neurological: negative for confusion, dizziness, headaches, impaired coordination/balance, memory loss, numbness/tingling, seizures, speech problems, tremors or weakness       Objective:   Temp 98 3 °F (36 8 °C) (Temporal)   Ht 5' 7" (1 702 m)   Wt 102 kg (225 lb)   BMI 35 24 kg/m²     Physical Exam:    General Appearance:    Alert, cooperative, no distress   Head:    Normocephalic, without obvious abnormality, atraumatic           Skin:   A full skin exam was performed including scalp, head scalp, eyes, ears, nose, lips, neck, chest, axilla, abdomen, back, buttocks, bilateral upper extremities, bilateral lower extremities, hands, feet, fingers, toes, fingernails, and toenails normal keratotic papules greasy stuck appearance nothing else remarkable noted on complete exam     Assessment:     1  Seborrheic keratosis     2  Skin lesion  Ambulatory referral to Dermatology   3   Screening for skin condition           Plan:          Cesilia White MD  2/10/2022,1:42 PM    Portions of the record may have been created with voice recognition software   Occasional wrong word or "sound a like" substitutions may have occurred due to the inherent limitations of voice recognition software   Read the chart carefully and recognize, using context, where substitutions have occurred

## 2023-05-01 ENCOUNTER — HOSPITAL ENCOUNTER (EMERGENCY)
Facility: HOSPITAL | Age: 68
Discharge: HOME | End: 2023-05-01
Attending: EMERGENCY MEDICINE | Admitting: EMERGENCY MEDICINE

## 2023-05-01 VITALS
SYSTOLIC BLOOD PRESSURE: 131 MMHG | OXYGEN SATURATION: 98 % | RESPIRATION RATE: 20 BRPM | BODY MASS INDEX: 34.87 KG/M2 | WEIGHT: 222.66 LBS | HEART RATE: 69 BPM | DIASTOLIC BLOOD PRESSURE: 68 MMHG | TEMPERATURE: 97.4 F

## 2023-05-01 DIAGNOSIS — K08.89 PAIN, DENTAL: Primary | ICD-10-CM

## 2023-05-01 RX ORDER — AMOXICILLIN AND CLAVULANATE POTASSIUM 875; 125 MG/1; MG/1
1 TABLET, FILM COATED ORAL ONCE
Status: COMPLETED | OUTPATIENT
Start: 2023-05-01 | End: 2023-05-01

## 2023-05-01 RX ORDER — AMOXICILLIN AND CLAVULANATE POTASSIUM 875; 125 MG/1; MG/1
1 TABLET, FILM COATED ORAL EVERY 12 HOURS
Qty: 14 TABLET | Refills: 0 | Status: SHIPPED | OUTPATIENT
Start: 2023-05-01 | End: 2023-05-08

## 2023-05-01 RX ORDER — KETOROLAC TROMETHAMINE 30 MG/ML
15 INJECTION, SOLUTION INTRAMUSCULAR; INTRAVENOUS ONCE
Status: COMPLETED | OUTPATIENT
Start: 2023-05-01 | End: 2023-05-01

## 2023-05-01 RX ORDER — NAPROXEN 500 MG/1
500 TABLET ORAL 2 TIMES DAILY WITH MEALS
Qty: 30 TABLET | Refills: 0 | Status: SHIPPED | OUTPATIENT
Start: 2023-05-01

## 2023-05-01 RX ADMIN — KETOROLAC TROMETHAMINE 15 MG: 30 INJECTION, SOLUTION INTRAMUSCULAR; INTRAVENOUS at 23:42

## 2023-05-01 RX ADMIN — AMOXICILLIN AND CLAVULANATE POTASSIUM 1 TABLET: 875; 125 TABLET, FILM COATED ORAL at 23:42

## 2023-05-02 NOTE — ED PROVIDER NOTES
History  Chief Complaint   Patient presents with    Facial Swelling     Pt reprots right sided dental pain since 5pm this evening  Reports pain with eating and drinking  Reports broken teeth on same side  Denies fever and chills  Patient is a 70-year-old male with no significant past medical history presenting to the emergency department for evaluation of right-sided facial pain  Symptoms began around 6 hours ago  He is also reporting chills without fever  Patient reports right-sided facial swelling  He is not having any difficulty breathing, difficulty swallowing  Denying any cough, congestion, sore throat, chest pain  Does not follow-up with a dentist   No other complaints at this time  Prior to Admission Medications   Prescriptions Last Dose Informant Patient Reported? Taking?   levothyroxine 100 mcg tablet   Yes No   Sig: Take 88 mcg by mouth daily      Facility-Administered Medications: None       Past Medical History:   Diagnosis Date    Anxiety     pt denies on PT eval on 11-4-19    Depression     pt denies on PT eval on 11-4-19    Disease of thyroid gland     No pertinent past medical history        Past Surgical History:   Procedure Laterality Date    BREAST LUMPECTOMY      1997    COLONOSCOPY      TONSILLECTOMY         Family History   Problem Relation Age of Onset    Hypertension Father     Stroke Father      I have reviewed and agree with the history as documented  E-Cigarette/Vaping    E-Cigarette Use Never User      E-Cigarette/Vaping Substances    Nicotine No     THC No     CBD No     Flavoring No     Other No     Unknown No      Social History     Tobacco Use    Smoking status: Never    Smokeless tobacco: Never   Vaping Use    Vaping Use: Never used   Substance Use Topics    Alcohol use:  Yes     Alcohol/week: 2 0 standard drinks     Types: 2 Cans of beer per week     Comment: occ    Drug use: Not Currently     Types: Marijuana     Comment: occ       Review of Systems   Constitutional: Positive for chills  Negative for fever  HENT: Positive for dental problem and facial swelling  Negative for congestion, rhinorrhea and sore throat  Respiratory: Negative for cough, chest tightness and shortness of breath  Cardiovascular: Negative for chest pain and palpitations  Gastrointestinal: Negative for abdominal pain, diarrhea, nausea and vomiting  All other systems reviewed and are negative  Physical Exam  Physical Exam  Vitals reviewed  Constitutional:       General: He is not in acute distress  Appearance: Normal appearance  He is normal weight  He is not ill-appearing, toxic-appearing or diaphoretic  HENT:      Head: Normocephalic and atraumatic  Comments: No facial swelling on my assessment despite subjective complaint of right-sided facial swelling  Right Ear: External ear normal       Left Ear: External ear normal       Mouth/Throat:      Dentition: Abnormal dentition  Does not have dentures  Dental tenderness and dental caries present  No gingival swelling or dental abscesses  Comments: Patient with multiple dental caries, fractured teeth, eroded teeth  Molars on the right lower side are tender to palpation  No evidence of abscess/gingival swelling  Eyes:      General: No scleral icterus  Right eye: No discharge  Left eye: No discharge  Extraocular Movements: Extraocular movements intact  Conjunctiva/sclera: Conjunctivae normal    Cardiovascular:      Rate and Rhythm: Normal rate and regular rhythm  Heart sounds: Normal heart sounds  No murmur heard  No friction rub  No gallop  Pulmonary:      Effort: Pulmonary effort is normal  No respiratory distress  Breath sounds: Normal breath sounds  No stridor  No wheezing, rhonchi or rales  Musculoskeletal:      Cervical back: Normal range of motion and neck supple  Skin:     General: Skin is warm and dry     Neurological:      Mental Status: He is alert and oriented to person, place, and time  Psychiatric:         Mood and Affect: Mood normal          Behavior: Behavior normal          Vital Signs  ED Triage Vitals [05/01/23 2252]   Temperature Pulse Respirations Blood Pressure SpO2   97 6 °F (36 4 °C) 69 20 131/68 98 %      Temp Source Heart Rate Source Patient Position - Orthostatic VS BP Location FiO2 (%)   Tympanic Monitor Sitting Right arm --      Pain Score       8           Vitals:    05/01/23 2252   BP: 131/68   Pulse: 69   Patient Position - Orthostatic VS: Sitting         Visual Acuity      ED Medications  Medications   ketorolac (TORADOL) injection 15 mg (15 mg Intramuscular Given 5/1/23 2342)   amoxicillin-clavulanate (AUGMENTIN) 875-125 mg per tablet 1 tablet (1 tablet Oral Given 5/1/23 2342)       Diagnostic Studies  Results Reviewed     None                 No orders to display              Procedures  Procedures         ED Course                               SBIRT 22yo+    Flowsheet Row Most Recent Value   Initial Alcohol Screen: US AUDIT-C     1  How often do you have a drink containing alcohol? 0 Filed at: 05/01/2023 2311   2  How many drinks containing alcohol do you have on a typical day you are drinking? 0 Filed at: 05/01/2023 2311   3a  Male UNDER 65: How often do you have five or more drinks on one occasion? 0 Filed at: 05/01/2023 2311   3b  FEMALE Any Age, or MALE 65+: How often do you have 4 or more drinks on one occassion? 0 Filed at: 05/01/2023 2311   Audit-C Score 0 Filed at: 05/01/2023 2311   ILDEFONSO: How many times in the past year have you    Used an illegal drug or used a prescription medication for non-medical reasons? Never Filed at: 05/01/2023 2311                    Medical Decision Making  Patient reporting for evaluation of right-sided facial/dental pain  Upon arrival patient appears comfortable  He is not in any acute distress  Vital signs are unremarkable    Patient does not have any facial swelling on my examination  He does have extremely poor dentition with multiple dental caries/erosion/missing teeth  The molars on the right lower side are tender to palpation  There is no evidence of abscess or gingival swelling  Patient was given Toradol for analgesia  He was also started on Augmentin for prophylaxis against infection or infection that is unable to be visualized on my assessment  He was given instructions to follow-up with dentistry and oral surgery  He is in stable condition at time of discharge  Strict return precautions were discussed  Pain, dental: acute illness or injury  Risk  Prescription drug management  Disposition  Final diagnoses:   Pain, dental     Time reflects when diagnosis was documented in both MDM as applicable and the Disposition within this note     Time User Action Codes Description Comment    5/1/2023 11:37 PM Marcelo Jung Add [K08 89] Pain, dental       ED Disposition     ED Disposition   Discharge    Condition   Stable    Date/Time   Mon May 1, 2023 11:37 PM    Comment   Adam Kruse discharge to home/self care                 Follow-up Information     Follow up With Specialties Details Why Contact Info Additional [facility] Emergency Department Emergency Medicine Go to  If symptoms worsen [address] Emergency Department, [address] [place] [address], Aaron Diaz [facility] [place]  Schedule an appointment as soon as possible for a visit  for follow up Nadine Holm [address] [facility] and [address] [facility]  Schedule an appointment as soon as possible for a visit  For follow up Nadine Conti 118  [phone]           Discharge Medication List as of 5/1/2023 11:39 PM      START taking these medications    Details   amoxicillin-clavulanate (AUGMENTIN) 875-125 mg per tablet Take 1 tablet by mouth every 12 (twelve) hours for 7 days, Starting Mon 5/1/2023, Until Mon 5/8/2023, Normal      naproxen (Naprosyn) 500 mg tablet Take 1 tablet (500 mg total) by mouth 2 (two) times a day with meals, Starting Mon 5/1/2023, Normal         CONTINUE these medications which have NOT CHANGED    Details   levothyroxine 100 mcg tablet Take 88 mcg by mouth daily, Historical Med             No discharge procedures on file      PDMP Review     None          ED Provider  Electronically Signed by           Estee Bustos PA-C  05/02/23 3172

## 2024-04-30 ENCOUNTER — OFFICE VISIT (OUTPATIENT)
Dept: URGENT CARE | Facility: CLINIC | Age: 69
End: 2024-04-30
Payer: MEDICARE

## 2024-04-30 VITALS
RESPIRATION RATE: 20 BRPM | HEART RATE: 74 BPM | SYSTOLIC BLOOD PRESSURE: 148 MMHG | TEMPERATURE: 97.5 F | DIASTOLIC BLOOD PRESSURE: 65 MMHG | OXYGEN SATURATION: 97 %

## 2024-04-30 DIAGNOSIS — K08.409 STATUS POST TOOTH EXTRACTION: Primary | ICD-10-CM

## 2024-04-30 PROCEDURE — G0463 HOSPITAL OUTPT CLINIC VISIT: HCPCS

## 2024-04-30 PROCEDURE — 99213 OFFICE O/P EST LOW 20 MIN: CPT

## 2024-04-30 RX ORDER — AMOXICILLIN 500 MG/1
500 CAPSULE ORAL 3 TIMES DAILY
COMMUNITY
Start: 2024-04-25

## 2024-04-30 NOTE — PATIENT INSTRUCTIONS
Finish current antibiotic course as prescribed   Practice good dental hygiene   Follow up with dentist

## 2024-04-30 NOTE — PROGRESS NOTES
[facility]        NAME: Heber Vincent is a 68 y.o. male  : 1955    MRN: [id number]  DATE: 2024  TIME: 1:22 PM    Assessment and Plan   Status post tooth extraction [K08.409]  1. Status post tooth extraction          Patient with tooth extracted 5 days ago. Well healing wound. No pain, swelling, fevers, or other signs of infection. Continue management per dentist recommendations.     Patient Instructions     Finish current antibiotic course as prescribed   Practice good dental hygiene   Follow up with dentist    Follow up with PCP in 3-5 days.  Proceed to  ER if symptoms worsen.    If tests are performed, our office will contact you with results only if changes need to made to the care plan discussed with you at the visit. You can review your full results on [facility].    Chief Complaint     Chief Complaint   Patient presents with    Dental Pain     Patient had tooth extracted to right bottom mouth on Thursday. Reports he feels a lump where the tooth was. Does not report pain.          History of Present Illness       Patient reports having a tooth extracted 5 day ago. Is currently on amoxicillin due to the dental procedure. Patient presented to the office today due to concerns regarding a bump at the site of the extraction. Denies any pain, erythema, swelling, or fevers.         Review of Systems   Review of Systems   Constitutional:  Negative for chills and fever.   HENT:  Negative for congestion, dental problem, postnasal drip, rhinorrhea, sinus pressure, sore throat and trouble swallowing.    Respiratory:  Negative for cough, chest tightness and shortness of breath.    Cardiovascular:  Negative for chest pain and palpitations.   Gastrointestinal:  Negative for abdominal pain, nausea and vomiting.   Genitourinary:  Negative for difficulty urinating.   Musculoskeletal:  Negative for myalgias.   Neurological:  Negative for dizziness and headaches.         Current Medications        Current Outpatient Medications:     amoxicillin (AMOXIL) 500 mg capsule, Take 500 mg by mouth 3 (three) times a day, Disp: , Rfl:     levothyroxine 100 mcg tablet, Take 88 mcg by mouth daily (Patient not taking: Reported on 4/30/2024), Disp: , Rfl:     naproxen (Naprosyn) 500 mg tablet, Take 1 tablet (500 mg total) by mouth 2 (two) times a day with meals (Patient not taking: Reported on 4/30/2024), Disp: 30 tablet, Rfl: 0    traMADol-acetaminophen (ULTRACET) 37.5-325 mg per tablet, Take 1 tablet by mouth every 8 (eight) hours as needed (Patient not taking: Reported on 4/30/2024), Disp: , Rfl:     Current Allergies     Allergies as of 04/30/2024 - Reviewed 04/30/2024   Allergen Reaction Noted    Other Swelling 08/06/2020            The following portions of the patient's history were reviewed and updated as appropriate: allergies, current medications, past family history, past medical history, past social history, past surgical history and problem list.     Past Medical History:   Diagnosis Date    Anxiety     pt denies on PT eval on 11-4-19    Depression     pt denies on PT eval on 11-4-19    Disease of thyroid gland     No pertinent past medical history        Past Surgical History:   Procedure Laterality Date    BREAST LUMPECTOMY      1997    COLONOSCOPY      TONSILLECTOMY      TOOTH EXTRACTION         Family History   Problem Relation Age of Onset    Hypertension Father     Stroke Father          Medications have been verified.        Objective   /65   Pulse 74   Temp 97.5 °F (36.4 °C)   Resp 20   SpO2 97%        Physical Exam     Physical Exam  Constitutional:       General: He is not in acute distress.  HENT:      Head: Normocephalic.      Nose: Nose normal.      Mouth/Throat:     Eyes:      Pupils: Pupils are equal, round, and reactive to light.   Cardiovascular:      Rate and Rhythm: Normal rate and regular rhythm.      Pulses: Normal pulses.      Heart sounds: Normal heart sounds.    Pulmonary:      Effort: Pulmonary effort is normal.      Breath sounds: Normal breath sounds.   Abdominal:      General: Abdomen is flat.   Musculoskeletal:         General: Normal range of motion.   Skin:     General: Skin is warm and dry.      Capillary Refill: Capillary refill takes less than 2 seconds.   Neurological:      Mental Status: He is alert and oriented to person, place, and time.

## 2024-08-13 ENCOUNTER — APPOINTMENT (EMERGENCY)
Dept: CT IMAGING | Facility: HOSPITAL | Age: 69
End: 2024-08-13
Payer: MEDICARE

## 2024-08-13 ENCOUNTER — APPOINTMENT (EMERGENCY)
Dept: RADIOLOGY | Facility: HOSPITAL | Age: 69
End: 2024-08-13
Payer: MEDICARE

## 2024-08-13 ENCOUNTER — HOSPITAL ENCOUNTER (EMERGENCY)
Facility: HOSPITAL | Age: 69
Discharge: HOME | End: 2024-08-13
Attending: EMERGENCY MEDICINE
Payer: MEDICARE

## 2024-08-13 ENCOUNTER — OFFICE VISIT (OUTPATIENT)
Dept: URGENT CARE | Facility: CLINIC | Age: 69
End: 2024-08-13
Payer: MEDICARE

## 2024-08-13 VITALS
RESPIRATION RATE: 18 BRPM | SYSTOLIC BLOOD PRESSURE: 140 MMHG | DIASTOLIC BLOOD PRESSURE: 80 MMHG | BODY MASS INDEX: 35.94 KG/M2 | WEIGHT: 229 LBS | HEIGHT: 67 IN | HEART RATE: 95 BPM | TEMPERATURE: 98.2 F | OXYGEN SATURATION: 93 %

## 2024-08-13 VITALS
BODY MASS INDEX: 35.77 KG/M2 | TEMPERATURE: 99.5 F | DIASTOLIC BLOOD PRESSURE: 56 MMHG | SYSTOLIC BLOOD PRESSURE: 111 MMHG | WEIGHT: 228.4 LBS | RESPIRATION RATE: 14 BRPM | OXYGEN SATURATION: 94 % | HEART RATE: 80 BPM

## 2024-08-13 DIAGNOSIS — J06.9 ACUTE URI: Primary | ICD-10-CM

## 2024-08-13 DIAGNOSIS — J02.9 SORE THROAT: ICD-10-CM

## 2024-08-13 DIAGNOSIS — J40 BRONCHITIS: Primary | ICD-10-CM

## 2024-08-13 LAB
2HR DELTA HS TROPONIN: -1 NG/L
ALBUMIN SERPL BCG-MCNC: 4.3 G/DL (ref 3.5–5)
ALP SERPL-CCNC: 52 U/L (ref 34–104)
ALT SERPL W P-5'-P-CCNC: 16 U/L (ref 7–52)
ANION GAP SERPL CALCULATED.3IONS-SCNC: 7 MMOL/L (ref 4–13)
AST SERPL W P-5'-P-CCNC: 22 U/L (ref 13–39)
BASOPHILS # BLD AUTO: 0.03 THOUSANDS/ÂΜL (ref 0–0.1)
BASOPHILS NFR BLD AUTO: 1 % (ref 0–1)
BILIRUB SERPL-MCNC: 0.83 MG/DL (ref 0.2–1)
BUN SERPL-MCNC: 15 MG/DL (ref 5–25)
CALCIUM SERPL-MCNC: 9.5 MG/DL (ref 8.4–10.2)
CARDIAC TROPONIN I PNL SERPL HS: 18 NG/L
CARDIAC TROPONIN I PNL SERPL HS: 19 NG/L
CHLORIDE SERPL-SCNC: 99 MMOL/L (ref 96–108)
CO2 SERPL-SCNC: 30 MMOL/L (ref 21–32)
CREAT SERPL-MCNC: 1.15 MG/DL (ref 0.6–1.3)
D DIMER PPP FEU-MCNC: 0.7 UG/ML FEU
EOSINOPHIL # BLD AUTO: 0.09 THOUSAND/ÂΜL (ref 0–0.61)
EOSINOPHIL NFR BLD AUTO: 2 % (ref 0–6)
ERYTHROCYTE [DISTWIDTH] IN BLOOD BY AUTOMATED COUNT: 14 % (ref 11.6–15.1)
FLUAV RNA RESP QL NAA+PROBE: NEGATIVE
FLUBV RNA RESP QL NAA+PROBE: NEGATIVE
GFR SERPL CREATININE-BSD FRML MDRD: 64 ML/MIN/1.73SQ M
GLUCOSE SERPL-MCNC: 97 MG/DL (ref 65–140)
HCT VFR BLD AUTO: 44.6 % (ref 36.5–49.3)
HGB BLD-MCNC: 14.3 G/DL (ref 12–17)
IMM GRANULOCYTES # BLD AUTO: 0.01 THOUSAND/UL (ref 0–0.2)
IMM GRANULOCYTES NFR BLD AUTO: 0 % (ref 0–2)
LACTATE SERPL-SCNC: 1 MMOL/L (ref 0.5–2)
LYMPHOCYTES # BLD AUTO: 0.64 THOUSANDS/ÂΜL (ref 0.6–4.47)
LYMPHOCYTES NFR BLD AUTO: 12 % (ref 14–44)
MCH RBC QN AUTO: 29.3 PG (ref 26.8–34.3)
MCHC RBC AUTO-ENTMCNC: 32.1 G/DL (ref 31.4–37.4)
MCV RBC AUTO: 91 FL (ref 82–98)
MONOCYTES # BLD AUTO: 0.64 THOUSAND/ÂΜL (ref 0.17–1.22)
MONOCYTES NFR BLD AUTO: 12 % (ref 4–12)
NEUTROPHILS # BLD AUTO: 4.14 THOUSANDS/ÂΜL (ref 1.85–7.62)
NEUTS SEG NFR BLD AUTO: 73 % (ref 43–75)
NRBC BLD AUTO-RTO: 0 /100 WBCS
PLATELET # BLD AUTO: 164 THOUSANDS/UL (ref 149–390)
PMV BLD AUTO: 11.4 FL (ref 8.9–12.7)
POTASSIUM SERPL-SCNC: 4.3 MMOL/L (ref 3.5–5.3)
PROT SERPL-MCNC: 7.2 G/DL (ref 6.4–8.4)
RBC # BLD AUTO: 4.88 MILLION/UL (ref 3.88–5.62)
RSV RNA RESP QL NAA+PROBE: NEGATIVE
SARS-COV-2 RNA RESP QL NAA+PROBE: NEGATIVE
SODIUM SERPL-SCNC: 136 MMOL/L (ref 135–147)
WBC # BLD AUTO: 5.55 THOUSAND/UL (ref 4.31–10.16)

## 2024-08-13 PROCEDURE — 85379 FIBRIN DEGRADATION QUANT: CPT | Performed by: EMERGENCY MEDICINE

## 2024-08-13 PROCEDURE — 96375 TX/PRO/DX INJ NEW DRUG ADDON: CPT

## 2024-08-13 PROCEDURE — 99285 EMERGENCY DEPT VISIT HI MDM: CPT | Performed by: EMERGENCY MEDICINE

## 2024-08-13 PROCEDURE — 96365 THER/PROPH/DIAG IV INF INIT: CPT

## 2024-08-13 PROCEDURE — 96366 THER/PROPH/DIAG IV INF ADDON: CPT

## 2024-08-13 PROCEDURE — 80053 COMPREHEN METABOLIC PANEL: CPT | Performed by: EMERGENCY MEDICINE

## 2024-08-13 PROCEDURE — 84484 ASSAY OF TROPONIN QUANT: CPT | Performed by: EMERGENCY MEDICINE

## 2024-08-13 PROCEDURE — 71275 CT ANGIOGRAPHY CHEST: CPT

## 2024-08-13 PROCEDURE — 36415 COLL VENOUS BLD VENIPUNCTURE: CPT | Performed by: EMERGENCY MEDICINE

## 2024-08-13 PROCEDURE — 99213 OFFICE O/P EST LOW 20 MIN: CPT | Performed by: PHYSICAL MEDICINE & REHABILITATION

## 2024-08-13 PROCEDURE — G0463 HOSPITAL OUTPT CLINIC VISIT: HCPCS | Performed by: PHYSICAL MEDICINE & REHABILITATION

## 2024-08-13 PROCEDURE — 0241U HB NFCT DS VIR RESP RNA 4 TRGT: CPT | Performed by: EMERGENCY MEDICINE

## 2024-08-13 PROCEDURE — 85025 COMPLETE CBC W/AUTO DIFF WBC: CPT | Performed by: EMERGENCY MEDICINE

## 2024-08-13 PROCEDURE — 71046 X-RAY EXAM CHEST 2 VIEWS: CPT

## 2024-08-13 PROCEDURE — 99284 EMERGENCY DEPT VISIT MOD MDM: CPT

## 2024-08-13 PROCEDURE — 93005 ELECTROCARDIOGRAM TRACING: CPT

## 2024-08-13 PROCEDURE — 83605 ASSAY OF LACTIC ACID: CPT | Performed by: EMERGENCY MEDICINE

## 2024-08-13 RX ORDER — ALBUTEROL SULFATE 90 UG/1
2 AEROSOL, METERED RESPIRATORY (INHALATION) EVERY 6 HOURS PRN
Qty: 8.5 G | Refills: 0 | Status: SHIPPED | OUTPATIENT
Start: 2024-08-13

## 2024-08-13 RX ORDER — KETOROLAC TROMETHAMINE 30 MG/ML
15 INJECTION, SOLUTION INTRAMUSCULAR; INTRAVENOUS ONCE
Status: COMPLETED | OUTPATIENT
Start: 2024-08-13 | End: 2024-08-13

## 2024-08-13 RX ORDER — ACETAMINOPHEN 10 MG/ML
1000 INJECTION, SOLUTION INTRAVENOUS ONCE
Status: COMPLETED | OUTPATIENT
Start: 2024-08-13 | End: 2024-08-13

## 2024-08-13 RX ADMIN — KETOROLAC TROMETHAMINE 15 MG: 30 INJECTION, SOLUTION INTRAMUSCULAR; INTRAVENOUS at 18:54

## 2024-08-13 RX ADMIN — SODIUM CHLORIDE 1000 ML: 0.9 INJECTION, SOLUTION INTRAVENOUS at 18:55

## 2024-08-13 RX ADMIN — IOHEXOL 85 ML: 350 INJECTION, SOLUTION INTRAVENOUS at 21:05

## 2024-08-13 RX ADMIN — ACETAMINOPHEN 1000 MG: 10 INJECTION INTRAVENOUS at 18:54

## 2024-08-13 NOTE — PROGRESS NOTES
[facility] Now        NAME: Heber Vincent is a 68 y.o. male  : 1955    MRN: [id number]  DATE: 2024  TIME: 1:53 PM    Assessment and Plan   Acute URI [J06.9]  1. Acute URI  amoxicillin-clavulanate (AUGMENTIN) 875-125 mg per tablet      2. Sore throat              Patient Instructions       Follow up with PCP in 3-5 days.  Proceed to  ER if symptoms worsen.    If tests are performed, our office will contact you with results only if changes need to made to the care plan discussed with you at the visit. You can review your full results on [facility].    Chief Complaint     Chief Complaint   Patient presents with    Cold Like Symptoms     Pt reports started with ST, congestion, cough, H/A, stomach ache lastnight         History of Present Illness       Patient presenting with sore throat, congestion, cough, headache and stomach ache that started 24.      Review of Systems   Review of Systems   HENT:  Positive for congestion and sore throat.    Respiratory:  Positive for cough.    Gastrointestinal:  Positive for abdominal pain.   Neurological:  Positive for headaches.         Current Medications       Current Outpatient Medications:     amoxicillin-clavulanate (AUGMENTIN) 875-125 mg per tablet, Take 1 tablet by mouth every 12 (twelve) hours for 7 days, Disp: 14 tablet, Rfl: 0    amoxicillin (AMOXIL) 500 mg capsule, Take 500 mg by mouth 3 (three) times a day (Patient not taking: Reported on 2024), Disp: , Rfl:     levothyroxine 100 mcg tablet, Take 88 mcg by mouth daily (Patient not taking: Reported on 2024), Disp: , Rfl:     naproxen (Naprosyn) 500 mg tablet, Take 1 tablet (500 mg total) by mouth 2 (two) times a day with meals (Patient not taking: Reported on 2024), Disp: 30 tablet, Rfl: 0    traMADol-acetaminophen (ULTRACET) 37.5-325 mg per tablet, Take 1 tablet by mouth every 8 (eight) hours as needed (Patient not taking: Reported on 2024), Disp: , Rfl:  "    Current Allergies     Allergies as of 08/13/2024 - Reviewed 08/13/2024   Allergen Reaction Noted    Other Swelling 08/06/2020            The following portions of the patient's history were reviewed and updated as appropriate: allergies, current medications, past family history, past medical history, past social history, past surgical history and problem list.     Past Medical History:   Diagnosis Date    Anxiety     pt denies on PT eval on 11-4-19    Depression     pt denies on PT eval on 11-4-19    Disease of thyroid gland     No pertinent past medical history        Past Surgical History:   Procedure Laterality Date    BREAST LUMPECTOMY      1997    COLONOSCOPY      TONSILLECTOMY      TOOTH EXTRACTION         Family History   Problem Relation Age of Onset    Hypertension Father     Stroke Father          Medications have been verified.        Objective   /80   Pulse 95   Temp 98.2 °F (36.8 °C)   Resp 18   Ht 5' 7\" (1.702 m)   Wt 104 kg (229 lb)   SpO2 93%   BMI 35.87 kg/m²        Physical Exam     Physical Exam  Constitutional:       General: He is not in acute distress.     Appearance: He is ill-appearing.   HENT:      Right Ear: Tympanic membrane normal.      Left Ear: Tympanic membrane normal.      Nose: Congestion present. No rhinorrhea.      Mouth/Throat:      Mouth: Mucous membranes are moist.      Pharynx: Oropharynx is clear. No oropharyngeal exudate or posterior oropharyngeal erythema.   Eyes:      Conjunctiva/sclera: Conjunctivae normal.   Cardiovascular:      Rate and Rhythm: Normal rate and regular rhythm.      Heart sounds: Normal heart sounds.   Pulmonary:      Effort: Pulmonary effort is normal. No respiratory distress.      Breath sounds: Normal breath sounds. No wheezing, rhonchi or rales.   Musculoskeletal:      Cervical back: Normal range of motion and neck supple.   Lymphadenopathy:      Cervical: No cervical adenopathy.   Skin:     General: Skin is warm.   Neurological:      " Mental Status: He is alert.   Psychiatric:         Mood and Affect: Mood normal.         Behavior: Behavior normal.

## 2024-08-13 NOTE — ED PROVIDER NOTES
History  Chief Complaint   Patient presents with    Fever     States he had a fever of 104, no tylenol or motrin taken PTA, was given abx from UC, + cough     Patient is a 68-year-old male past medical history of BPH, cauda equina presenting with fever.  Patient states that he did fevers that started 1 hour ago and was seen in urgent care and given Augmentin which she started.  He notes a productive cough with nonbloody clear sputum as well as shortness of breath intermittent exertional nature since yesterday.  Notes frontal headache and intermittent lightheadedness.  Denies any chest pain, nausea/vomiting/diarrhea/constipation, rashes, vision changes.  Does note mild periumbilical abdominal pain.  Has not taken any medication for fever prior to arrival.  Does note sore throat.        Prior to Admission Medications   Prescriptions Last Dose Informant Patient Reported? Taking?   amoxicillin (AMOXIL) 500 mg capsule   Yes No   Sig: Take 500 mg by mouth 3 (three) times a day   Patient not taking: Reported on 8/13/2024   amoxicillin-clavulanate (AUGMENTIN) 875-125 mg per tablet   No No   Sig: Take 1 tablet by mouth every 12 (twelve) hours for 7 days   levothyroxine 100 mcg tablet  Self Yes No   Sig: Take 88 mcg by mouth daily   Patient not taking: Reported on 4/30/2024   naproxen (Naprosyn) 500 mg tablet   No No   Sig: Take 1 tablet (500 mg total) by mouth 2 (two) times a day with meals   Patient not taking: Reported on 4/30/2024   traMADol-acetaminophen (ULTRACET) 37.5-325 mg per tablet   Yes No   Sig: Take 1 tablet by mouth every 8 (eight) hours as needed   Patient not taking: Reported on 4/30/2024      Facility-Administered Medications: None       Past Medical History:   Diagnosis Date    Anxiety     pt denies on PT eval on 11-4-19    Depression     pt denies on PT eval on 11-4-19    Disease of thyroid gland     No pertinent past medical history        Past Surgical History:   Procedure Laterality Date    BREAST  LUMPECTOMY      1997    COLONOSCOPY      TONSILLECTOMY      TOOTH EXTRACTION         Family History   Problem Relation Age of Onset    Hypertension Father     Stroke Father      I have reviewed and agree with the history as documented.    E-Cigarette/Vaping    E-Cigarette Use Never User      E-Cigarette/Vaping Substances    Nicotine No     THC No     CBD No     Flavoring No     Other No     Unknown No      Social History     Tobacco Use    Smoking status: Never    Smokeless tobacco: Never   Vaping Use    Vaping status: Never Used   Substance Use Topics    Alcohol use: Not Currently     Alcohol/week: 2.0 standard drinks of alcohol     Types: 2 Cans of beer per week     Comment: occ    Drug use: Not Currently     Comment: [facility]       Review of Systems   All other systems reviewed and are negative.      Physical Exam  Physical Exam  Vitals reviewed.   Constitutional:       General: He is not in acute distress.     Appearance: Normal appearance. He is not ill-appearing.   HENT:      Mouth/Throat:      Mouth: Mucous membranes are moist.   Eyes:      Conjunctiva/sclera: Conjunctivae normal.   Cardiovascular:      Rate and Rhythm: Normal rate and regular rhythm.      Pulses: Normal pulses.      Heart sounds: Normal heart sounds.   Pulmonary:      Effort: Pulmonary effort is normal.      Breath sounds: Normal breath sounds.   Abdominal:      General: Abdomen is flat.      Palpations: Abdomen is soft.      Tenderness: There is no abdominal tenderness.   Musculoskeletal:         General: No swelling. Normal range of motion.      Cervical back: Normal range of motion and neck supple.      Right lower leg: No edema.      Left lower leg: No edema.   Skin:     General: Skin is warm and dry.   Neurological:      General: No focal deficit present.      Mental Status: He is alert.      Motor: No weakness.      Gait: Gait normal.   Psychiatric:         Mood and Affect: Mood normal.         Vital Signs  ED Triage Vitals   Temperature  Pulse Respirations Blood Pressure SpO2   08/13/24 1802 08/13/24 1802 08/13/24 1802 08/13/24 1802 08/13/24 1802   99.5 °F (37.5 °C) (!) 115 18 128/79 94 %      Temp Source Heart Rate Source Patient Position - Orthostatic VS BP Location FiO2 (%)   08/13/24 1802 08/13/24 1802 08/13/24 1802 08/13/24 1802 --   Oral Monitor Sitting Left arm       Pain Score       08/13/24 1854       8           Vitals:    08/13/24 1802   BP: 128/79   Pulse: (!) 115   Patient Position - Orthostatic VS: Sitting         Visual Acuity      ED Medications  Medications   sodium chloride 0.9 % bolus 1,000 mL (0 mL Intravenous Stopped 8/13/24 2037)   acetaminophen (Ofirmev) injection 1,000 mg (0 mg Intravenous Stopped 8/13/24 2037)   ketorolac (TORADOL) injection 15 mg (15 mg Intravenous Given 8/13/24 1854)   iohexol (OMNIPAQUE) 350 MG/ML injection (MULTI-DOSE) 100 mL (85 mL Intravenous Given 8/13/24 2105)       Diagnostic Studies  Results Reviewed       Procedure Component Value Units Date/Time    HS Troponin I 2hr [[id number]]  (Normal) Collected: 08/13/24 2042    Lab Status: Final result Specimen: Blood from Arm, Right Updated: 08/13/24 2114     hs TnI 2hr 18 ng/L      Delta 2hr hsTnI -1 ng/L     HS Troponin I 4hr [[id number]]     Lab Status: No result Specimen: Blood     D-Dimer [[id number]]  (Abnormal) Collected: 08/13/24 1851    Lab Status: Final result Specimen: Blood from Arm, Left Updated: 08/13/24 1932     D-Dimer, Quant 0.70 ug/ml FEU     Narrative:      In the evaluation for possible pulmonary embolism, in the appropriate (Well's Score of 4 or less) patient, the age adjusted d-dimer cutoff for this patient can be calculated as:    Age x 0.01 (in ug/mL) for Age-adjusted D-dimer exclusion threshold for a patient over 50 years.    HS Troponin 0hr (reflex protocol) [[id number]]  (Normal) Collected: 08/13/24 1851    Lab Status: Final result Specimen: Blood from Arm, Left Updated: 08/13/24 1919     hs TnI 0hr 19 ng/L     FLU/RSV/COVID - if  FLU/RSV clinically relevant [[id number]]  (Normal) Collected: 08/13/24 1834    Lab Status: Final result Specimen: Nares from Nose Updated: 08/13/24 1915     SARS-CoV-2 Negative     INFLUENZA A PCR Negative     INFLUENZA B PCR Negative     RSV PCR Negative    Narrative:      This test has been performed using the CoV-2/Flu/RSV plus assay on the [employer] GeneXpert platform. This test has been validated by the  and verified by the performing laboratory.     This test is designed to amplify and detect the following: nucleocapsid (N), envelope (E), and RNA-dependent RNA polymerase (RdRP) genes of the SARS-CoV-2 genome; matrix (M), basic polymerase (PB2), and acidic protein (PA) segments of the influenza A genome; matrix (M) and non-structural protein (NS) segments of the influenza B genome, and the nucleocapsid genes of RSV A and RSV B.     Positive results are indicative of the presence of Flu A, Flu B, RSV, and/or SARS-CoV-2 RNA. Positive results for SARS-CoV-2 or suspected novel influenza should be reported to state, local, or federal health departments according to local reporting requirements.      All results should be assessed in conjunction with clinical presentation and other laboratory markers for clinical management.     FOR PEDIATRIC PATIENTS - copy/paste COVID Guidelines URL to browser: [email or web address]       Comprehensive metabolic panel [[id number]] Collected: 08/13/24 1851    Lab Status: Final result Specimen: Blood from Arm, Left Updated: 08/13/24 1911     Sodium 136 mmol/L      Potassium 4.3 mmol/L      Chloride 99 mmol/L      CO2 30 mmol/L      ANION GAP 7 mmol/L      BUN 15 mg/dL      Creatinine 1.15 mg/dL      Glucose 97 mg/dL      Calcium 9.5 mg/dL      AST 22 U/L      ALT 16 U/L      Alkaline Phosphatase 52 U/L      Total Protein 7.2 g/dL      Albumin 4.3 g/dL      Total Bilirubin 0.83 mg/dL      eGFR 64 ml/min/1.73sq m      Narrative:      National Kidney Disease Foundation guidelines for Chronic Kidney Disease (CKD):     Stage 1 with normal or high GFR (GFR > 90 mL/min/1.73 square meters)    Stage 2 Mild CKD (GFR = 60-89 mL/min/1.73 square meters)    Stage 3A Moderate CKD (GFR = 45-59 mL/min/1.73 square meters)    Stage 3B Moderate CKD (GFR = 30-44 mL/min/1.73 square meters)    Stage 4 Severe CKD (GFR = 15-29 mL/min/1.73 square meters)    Stage 5 End Stage CKD (GFR <15 mL/min/1.73 square meters)  Note: GFR calculation is accurate only with a steady state creatinine    Lactic acid, plasma (w/reflex if result > 2.0) [[id number]]  (Normal) Collected: 08/13/24 1851    Lab Status: Final result Specimen: Blood from Arm, Left Updated: 08/13/24 1910     LACTIC ACID 1.0 mmol/L     Narrative:      Result may be elevated if tourniquet was used during collection.    CBC and differential [[id number]]  (Abnormal) Collected: 08/13/24 1851    Lab Status: Final result Specimen: Blood from Arm, Left Updated: 08/13/24 1858     WBC 5.55 Thousand/uL      RBC 4.88 Million/uL      Hemoglobin 14.3 g/dL      Hematocrit 44.6 %      MCV 91 fL      MCH 29.3 pg      MCHC 32.1 g/dL      RDW 14.0 %      MPV 11.4 fL      Platelets 164 Thousands/uL      nRBC 0 /100 WBCs      Segmented % 73 %      Immature Grans % 0 %      Lymphocytes % 12 %      Monocytes % 12 %      Eosinophils Relative 2 %      Basophils Relative 1 %      Absolute Neutrophils 4.14 Thousands/µL      Absolute Immature Grans 0.01 Thousand/uL      Absolute Lymphocytes 0.64 Thousands/µL      Absolute Monocytes 0.64 Thousand/µL      Eosinophils Absolute 0.09 Thousand/µL      Basophils Absolute 0.03 Thousands/µL                    CTA ED chest PE study   Final Result by Mayela Colindres MD (08/13 2133)      No evidence of pulmonary embolus.      Mild diffuse bronchial wall thickening compatible with nonspecific bronchitis.      Cholelithiasis without evidence of acute cholecystitis.                   Workstation performed: [id number]         XR chest 2 views   ED Interpretation by Fouzia Pinedo DO (08/13 1903)   NAD                 Procedures  ECG 12 Lead Documentation Only    Date/Time: 8/13/2024 7:16 PM    Performed by: Fouzia Pinedo DO  Authorized by: Fouzia Pinedo DO    Patient location:  ED  Previous ECG:     Previous ECG:  Unavailable  Interpretation:     Interpretation: normal    Rate:     ECG rate assessment: normal    Rhythm:     Rhythm: sinus rhythm    Ectopy:     Ectopy: none    QRS:     QRS axis:  Normal    QRS intervals:  Normal  Conduction:     Conduction: normal    ST segments:     ST segments:  Normal  T waves:     T waves: normal             ED Course  ED Course as of 08/13/24 2136   Tue Aug 13, 2024   2135 Workup unremarkable, have discussed return precautions and outpatient follow-up and patient states he understands.                                               Medical Decision Making  Patient is a 68-year-old male with past medical history of cauda equina and BPH presenting with fever.  Patient is well-appearing at bedside though with low-grade elevated temperature and currently with low-grade tachycardia mentating appropriately and moving all extremities equally, ambulatory bedside with steady gait with lungs good auscultation, soft nontender abdomen and no other significant physical exam findings.  Will obtain labs to assess for electrolyte MIs, anemia, chest x-ray to assess for pneumonia, EKG and troponin to assess for ACS or arrhythmia, COVID testing to assess for viral infection, give symptomatic management and reassess.    Amount and/or Complexity of Data Reviewed  Labs: ordered.  Radiology: ordered and independent interpretation performed.    Risk  Prescription drug management.                 Disposition  Final diagnoses:   Bronchitis     Time reflects when diagnosis was documented in both MDM as applicable and the Disposition within this note       Time User  Action Codes Description Comment    8/13/2024  9:36 PM Fouzia Pinedo [J40] Bronchitis           ED Disposition       ED Disposition   Discharge    Condition   Stable    Date/Time   Tue Aug 13, 2024  9:36 PM    Comment   Heber Vincent discharge to home/self care.                   Follow-up Information       Follow up With Specialties Details Why Contact Info Additional Information    Rony Vyas MD Internal Medicine In 1 week  [address] [facility] [address]  [phone]       [facility] Emergency Medicine  If symptoms worsen [address] [phone] [facility], [address] [place], [place], [address]            Patient's Medications   Discharge Prescriptions    ALBUTEROL (PROAIR HFA) 90 MCG/ACT INHALER    Inhale 2 puffs every 6 (six) hours as needed for wheezing       Start Date: 8/13/2024 End Date: --       Order Dose: 2 puffs       Quantity: 8.5 g    Refills: 0       No discharge procedures on file.    PDMP Review       None            ED Provider  Electronically Signed by             Fouzia Pinedo DO  08/13/24 2831

## 2024-08-14 LAB
ATRIAL RATE: 100 BPM
P AXIS: 62 DEGREES
PR INTERVAL: 170 MS
QRS AXIS: 26 DEGREES
QRSD INTERVAL: 92 MS
QT INTERVAL: 352 MS
QTC INTERVAL: 454 MS
T WAVE AXIS: 47 DEGREES
VENTRICULAR RATE: 100 BPM

## 2024-08-14 PROCEDURE — 93010 ELECTROCARDIOGRAM REPORT: CPT | Performed by: INTERNAL MEDICINE

## 2024-08-14 NOTE — ED NOTES
Patient ambulated out of the ED, AAOx4 resp even and unlabored with no S$S of distress.       Jeff Markham RN  08/13/24 2644

## 2025-01-07 ENCOUNTER — APPOINTMENT (OUTPATIENT)
Dept: LAB | Facility: CLINIC | Age: 70
End: 2025-01-07
Payer: MEDICARE

## 2025-01-07 DIAGNOSIS — I51.9 MYXEDEMA HEART DISEASE: ICD-10-CM

## 2025-01-07 DIAGNOSIS — E03.9 MYXEDEMA HEART DISEASE: ICD-10-CM

## 2025-01-07 LAB — TSH SERPL DL<=0.05 MIU/L-ACNC: 18.53 UIU/ML (ref 0.45–4.5)

## 2025-01-07 PROCEDURE — 84443 ASSAY THYROID STIM HORMONE: CPT

## 2025-01-07 PROCEDURE — 36415 COLL VENOUS BLD VENIPUNCTURE: CPT

## 2025-07-18 ENCOUNTER — OFFICE VISIT (OUTPATIENT)
Dept: GASTROENTEROLOGY | Facility: CLINIC | Age: 70
End: 2025-07-18
Payer: MEDICARE

## 2025-07-18 VITALS
BODY MASS INDEX: 37.35 KG/M2 | HEART RATE: 67 BPM | HEIGHT: 67 IN | DIASTOLIC BLOOD PRESSURE: 62 MMHG | SYSTOLIC BLOOD PRESSURE: 100 MMHG | TEMPERATURE: 97.8 F | WEIGHT: 238 LBS | OXYGEN SATURATION: 96 %

## 2025-07-18 DIAGNOSIS — Z86.0100 HISTORY OF COLON POLYPS: Primary | ICD-10-CM

## 2025-07-18 PROCEDURE — 99203 OFFICE O/P NEW LOW 30 MIN: CPT | Performed by: PHYSICIAN ASSISTANT

## 2025-07-18 RX ORDER — SODIUM CHLORIDE, SODIUM LACTATE, POTASSIUM CHLORIDE, CALCIUM CHLORIDE 600; 310; 30; 20 MG/100ML; MG/100ML; MG/100ML; MG/100ML
125 INJECTION, SOLUTION INTRAVENOUS CONTINUOUS
Status: CANCELLED | OUTPATIENT
Start: 2025-07-18

## 2025-07-18 NOTE — PROGRESS NOTES
Name: Heber Vincent      : 1955      MRN: [id number]  Encounter Provider: Lucy Baez PA-C  Encounter Date: 2025   Encounter department: [facility] [place]  :  Assessment & Plan  History of colon polyps    Patient presents to schedule a colonoscopy.  He had a colonoscopy in 2019 and 9 polyps were removed.  Repeat colonoscopy was recommended in 3 years.  He does report some recent changes in his bowel movements with thinner stools.    Will plan for colonoscopy to investigate.     Orders:    Colonoscopy; Future        History of Present Illness   HPI  Heber Vincent is a 69 y.o. male who presents to the office to schedule a colonoscopy. Patient previously had a colonoscopy in 2019 and 9 polyps were removed.  Repeat colonoscopy recommended in 3 years.  He does report some changes in his bowel movements recently with thinner stools.  No rectal bleeding. No abdominal pain. No nausea or vomiting. No unintentional weight loss.  No family history of colon cancer.  History obtained from: patient    I discussed informed consent with the patient for the colonoscopy. The risks/benefits of the procedure were discussed with the patient. Risks included, but not limited to, infection, bleeding, perforation were discussed. Patient was agreeable.     Review of Systems   Constitutional: Negative.    HENT: Negative.     Eyes: Negative.    Respiratory: Negative.     Cardiovascular: Negative.    Gastrointestinal: Negative.    Genitourinary: Negative.    Musculoskeletal: Negative.    Skin: Negative.    Hematological: Negative.    Psychiatric/Behavioral: Negative.       Medical History Reviewed by provider this encounter:     .  Past Medical History   Past Medical History[1]  Past Surgical History[2]  Family History[3]   reports that he has never smoked. He has never used smokeless tobacco. He reports that he does not currently use alcohol after a past usage of  "about 2.0 standard drinks of alcohol per week. He reports that he does not currently use drugs.  Current Outpatient Medications   Medication Instructions    albuterol (ProAir HFA) 90 mcg/act inhaler 2 puffs, Inhalation, Every 6 hours PRN    amoxicillin (AMOXIL) 500 mg, 3 times daily    levothyroxine 88 mcg, Daily    naproxen (NAPROSYN) 500 mg, Oral, 2 times daily with meals    traMADol-acetaminophen (ULTRACET) 37.5-325 mg per tablet 1 tablet, Every 8 hours PRN   Allergies[4]   Medications Ordered Prior to Encounter[5]   Social History[6]     Objective   /62 (BP Location: Left arm, Patient Position: Sitting, Cuff Size: Standard)   Pulse 67   Temp 97.8 °F (36.6 °C) (Temporal)   Ht 5' 7\" (1.702 m)   Wt 108 kg (238 lb)   SpO2 96%   BMI 37.28 kg/m²      Physical Exam  Constitutional:       General: He is not in acute distress.     Appearance: Normal appearance. He is not ill-appearing.   HENT:      Head: Normocephalic and atraumatic.     Cardiovascular:      Rate and Rhythm: Normal rate and regular rhythm.   Pulmonary:      Effort: Pulmonary effort is normal. No respiratory distress.      Breath sounds: Normal breath sounds.     Skin:     General: Skin is warm and dry.     Neurological:      Mental Status: He is alert and oriented to person, place, and time.     Psychiatric:         Mood and Affect: Mood normal.         Behavior: Behavior normal.              [1]   Past Medical History:  Diagnosis Date    Anxiety     pt denies on PT eval on 11-4-19    Depression     pt denies on PT eval on 11-4-19    Disease of thyroid gland     No pertinent past medical history    [2]   Past Surgical History:  Procedure Laterality Date    BREAST LUMPECTOMY      1997    COLONOSCOPY      TONSILLECTOMY      TOOTH EXTRACTION     [3]   Family History  Problem Relation Name Age of Onset    Hypertension Father      Stroke Father     [4]   Allergies  Allergen Reactions    Bee Venom Swelling    Other Swelling     Bee stings     [5] "   Current Outpatient Medications on File Prior to Visit   Medication Sig Dispense Refill    albuterol (ProAir HFA) 90 mcg/act inhaler Inhale 2 puffs every 6 (six) hours as needed for wheezing 8.5 g 0    levothyroxine 100 mcg tablet Take 88 mcg by mouth daily      amoxicillin (AMOXIL) 500 mg capsule Take 500 mg by mouth 3 (three) times a day (Patient not taking: Reported on 8/13/2024)      naproxen (Naprosyn) 500 mg tablet Take 1 tablet (500 mg total) by mouth 2 (two) times a day with meals (Patient not taking: Reported on 4/30/2024) 30 tablet 0    traMADol-acetaminophen (ULTRACET) 37.5-325 mg per tablet Take 1 tablet by mouth every 8 (eight) hours as needed (Patient not taking: Reported on 4/30/2024)       No current facility-administered medications on file prior to visit.   [6]   Social History  Tobacco Use    Smoking status: Never    Smokeless tobacco: Never   Vaping Use    Vaping status: Never Used   Substance and Sexual Activity    Alcohol use: Not Currently     Alcohol/week: 2.0 standard drinks of alcohol     Types: 2 Cans of beer per week     Comment: occ    Drug use: Not Currently     Comment: occ

## 2025-07-18 NOTE — PATIENT INSTRUCTIONS
Scheduled date of colonoscopy (as of today):7/23/25  Physician performing colonoscopy:Kaylah  Location of colonoscopy:[facility]  Bowel prep reviewed with patient:miralax/dulcolax  Instructions reviewed with patient by:Danna VALDOVINOS  Clearances:  none

## 2025-07-23 ENCOUNTER — ANESTHESIA EVENT (OUTPATIENT)
Dept: GASTROENTEROLOGY | Facility: HOSPITAL | Age: 70
End: 2025-07-23
Payer: MEDICARE

## 2025-07-23 ENCOUNTER — ANESTHESIA (OUTPATIENT)
Dept: GASTROENTEROLOGY | Facility: HOSPITAL | Age: 70
End: 2025-07-23
Payer: MEDICARE

## 2025-07-23 ENCOUNTER — HOSPITAL ENCOUNTER (OUTPATIENT)
Dept: GASTROENTEROLOGY | Facility: HOSPITAL | Age: 70
Setting detail: OUTPATIENT SURGERY
Discharge: HOME | End: 2025-07-23
Attending: PHYSICIAN ASSISTANT
Payer: MEDICARE

## 2025-07-23 VITALS
RESPIRATION RATE: 18 BRPM | BODY MASS INDEX: 37.09 KG/M2 | TEMPERATURE: 98.5 F | HEIGHT: 67 IN | HEART RATE: 57 BPM | SYSTOLIC BLOOD PRESSURE: 107 MMHG | WEIGHT: 236.33 LBS | OXYGEN SATURATION: 95 % | DIASTOLIC BLOOD PRESSURE: 62 MMHG

## 2025-07-23 DIAGNOSIS — Z86.0100 HISTORY OF COLON POLYPS: ICD-10-CM

## 2025-07-23 PROCEDURE — 45385 COLONOSCOPY W/LESION REMOVAL: CPT | Performed by: INTERNAL MEDICINE

## 2025-07-23 PROCEDURE — 88305 TISSUE EXAM BY PATHOLOGIST: CPT | Performed by: PATHOLOGY

## 2025-07-23 RX ORDER — SODIUM CHLORIDE, SODIUM LACTATE, POTASSIUM CHLORIDE, CALCIUM CHLORIDE 600; 310; 30; 20 MG/100ML; MG/100ML; MG/100ML; MG/100ML
125 INJECTION, SOLUTION INTRAVENOUS CONTINUOUS
Status: DISCONTINUED | OUTPATIENT
Start: 2025-07-23 | End: 2025-07-27 | Stop reason: HOSPADM

## 2025-07-23 RX ORDER — PROPOFOL 10 MG/ML
INJECTION, EMULSION INTRAVENOUS AS NEEDED
Status: DISCONTINUED | OUTPATIENT
Start: 2025-07-23 | End: 2025-07-23

## 2025-07-23 RX ORDER — LIDOCAINE HYDROCHLORIDE 20 MG/ML
INJECTION, SOLUTION EPIDURAL; INFILTRATION; INTRACAUDAL; PERINEURAL AS NEEDED
Status: DISCONTINUED | OUTPATIENT
Start: 2025-07-23 | End: 2025-07-23

## 2025-07-23 RX ADMIN — PROPOFOL 50 MG: 10 INJECTION, EMULSION INTRAVENOUS at 13:29

## 2025-07-23 RX ADMIN — PROPOFOL 150 MG: 10 INJECTION, EMULSION INTRAVENOUS at 13:25

## 2025-07-23 RX ADMIN — LIDOCAINE HYDROCHLORIDE 100 MG: 20 INJECTION, SOLUTION EPIDURAL; INFILTRATION; INTRACAUDAL; PERINEURAL at 13:25

## 2025-07-23 NOTE — ANESTHESIA PREPROCEDURE EVALUATION
Procedure:  COLONOSCOPY    Relevant Problems   /RENAL   (+) BPH with urinary obstruction      MUSCULOSKELETAL   (+) Neurogenic claudication due to lumbar spinal stenosis     Allergies   Allergen Reactions    Bee Venom Swelling    Other Swelling     Bee stings       Social History[1]  Current Outpatient Medications   Medication Instructions    albuterol (ProAir HFA) 90 mcg/act inhaler 2 puffs, Inhalation, Every 6 hours PRN    amoxicillin (AMOXIL) 500 mg, 3 times daily    levothyroxine 88 mcg, Daily    naproxen (NAPROSYN) 500 mg, Oral, 2 times daily with meals    traMADol-acetaminophen (ULTRACET) 37.5-325 mg per tablet 1 tablet, Every 8 hours PRN     Lab Results   Component Value Date    WBC 5.55 08/13/2024    HGB 14.3 08/13/2024    HCT 44.6 08/13/2024     08/13/2024    SODIUM 142 09/10/2024    K 4.6 09/10/2024     09/10/2024    CO2 29 09/10/2024    BUN 17 09/10/2024    CREATININE 0.9 09/10/2024    GLUC 80 09/10/2024    HGBA1C 5.6 01/07/2020    AST 23 09/10/2024    ALT 26 09/10/2024    ALKPHOS 67 09/10/2024    TBILI 1 09/10/2024    ALB 4 09/10/2024    PROTIME 13.4 01/07/2020    PTT 43 (H) 01/07/2020    INR 1.1 01/12/2020     Vitals:    07/23/25 1247   BP: 125/60   Pulse: 71   Resp: 16   Temp: 97.8 °F (36.6 °C)   SpO2: 97%     TTE 4/20/21  SUMMARY     LEFT VENTRICLE:  Ejection fraction was estimated to be 60 %.  There were no regional wall motion abnormalities.  Doppler parameters were consistent with abnormal left ventricular relaxation (grade 1 diastolic dysfunction).     MITRAL VALVE:  There was mild to moderate regurgitation.     TRICUSPID VALVE:  There was trace regurgitation.     PULMONIC VALVE:  There was trace regurgitation.    NM stress test 4/6/21  SUMMARY:  -  Stress results: There was no chest pain during stress.  -  ECG conclusions: The stress ECG was negative for ischemia and normal.  -  Gated SPECT: The calculated left ventricular ejection fraction was 58 %. There was mildly reduced  myocardial thickening and motion of the ? basal inferior wall.     IMPRESSIONS: Moderate sized , moderate intensity inferior and apical defect which resolves to an extent with prone imaging ( not completely). Even though this may represent likely diaphragmatic attenuation, small area of inferior ischemia  can not be ruled out with certainty. Clinical correlation suggested. Overall left ventricular systolic function was preserved, with mild hypokinesis of the basal inferior wall.     EKG 8/13/24  Normal sinus rhythm  Possible Lateral infarct , age undetermined  Inferior infarct , age undetermined  Confirmed by Caridad Watters ([id number]) on 8/14/2024 4:19:32 PM        Physical Exam    Airway     Mallampati score: III  TM Distance: >3 FB  Neck ROM: full  Mouth opening: >= 4 cm      Cardiovascular  Rhythm: regular, Rate: normal, No murmurCardiovascular exam normal    Dental   No notable dental hx     Pulmonary  Pulmonary exam normal Breath sounds clear to auscultation, No wheezes    Neurological    He appears awake, alert and oriented x3.      Other Findings        Anesthesia Plan  ASA Score- 3     Anesthesia Type- IV sedation with anesthesia with ASA Monitors.         Additional Monitors:     Airway Plan: natural airway.    Comment: O2 mask, natural airway, EtCO2 monitor. Risks discussed including awareness, aspiration, drug reactions and conversion to GA..       Plan Factors-Exercise tolerance (METS): <4 METS.    Chart reviewed. EKG reviewed.  Existing labs reviewed. Patient summary reviewed.    Patient is not a current smoker.              Induction- intravenous.    Postoperative Plan- .   Monitoring Plan - Monitoring plan - standard ASA monitoring      Perioperative Resuscitation Plan - Level 1 - Full Code.       Informed Consent- Anesthetic plan and risks discussed with patient.  I personally reviewed this patient with the CRNA. Discussed and agreed on the Anesthesia Plan with the CRNA..      NPO Status:  Vitals  Value Taken Time   Date of last liquid 07/23/25 07/23/25 12:47   Time of last liquid 0700 07/23/25 12:47   Date of last solid 07/20/25 07/23/25 12:47   Time of last solid 2000 07/23/25 12:47              [1]   Social History  Tobacco Use    Smoking status: Never    Smokeless tobacco: Never   Vaping Use    Vaping status: Never Used   Substance Use Topics    Alcohol use: Not Currently     Alcohol/week: 2.0 standard drinks of alcohol     Types: 2 Cans of beer per week     Comment: occ    Drug use: Yes     Types: Marijuana     Comment: occ

## 2025-07-28 PROCEDURE — 88305 TISSUE EXAM BY PATHOLOGIST: CPT | Performed by: PATHOLOGY

## 2025-08-01 ENCOUNTER — RESULTS FOLLOW-UP (OUTPATIENT)
Dept: GASTROENTEROLOGY | Facility: CLINIC | Age: 70
End: 2025-08-01

## 2025-08-07 ENCOUNTER — HOSPITAL ENCOUNTER (EMERGENCY)
Facility: HOSPITAL | Age: 70
Discharge: HOME | End: 2025-08-07
Payer: MEDICARE